# Patient Record
Sex: FEMALE | Race: WHITE | NOT HISPANIC OR LATINO | Employment: OTHER | URBAN - NONMETROPOLITAN AREA
[De-identification: names, ages, dates, MRNs, and addresses within clinical notes are randomized per-mention and may not be internally consistent; named-entity substitution may affect disease eponyms.]

---

## 2023-05-23 LAB
ESTIMATED AVERAGE GLUCOSE FOR HBA1C: 123 MG/DL
HEMOGLOBIN A1C/HEMOGLOBIN TOTAL IN BLOOD: 5.9 %

## 2023-05-24 LAB
ALANINE AMINOTRANSFERASE (SGPT) (U/L) IN SER/PLAS: 19 U/L (ref 7–45)
ALBUMIN (G/DL) IN SER/PLAS: 4.4 G/DL (ref 3.4–5)
ALKALINE PHOSPHATASE (U/L) IN SER/PLAS: 33 U/L (ref 33–136)
ANION GAP IN SER/PLAS: 13 MMOL/L (ref 10–20)
ASPARTATE AMINOTRANSFERASE (SGOT) (U/L) IN SER/PLAS: 19 U/L (ref 9–39)
BASOPHILS (10*3/UL) IN BLOOD BY AUTOMATED COUNT: 0.04 X10E9/L (ref 0–0.1)
BASOPHILS/100 LEUKOCYTES IN BLOOD BY AUTOMATED COUNT: 0.7 % (ref 0–2)
BILIRUBIN TOTAL (MG/DL) IN SER/PLAS: 0.4 MG/DL (ref 0–1.2)
CALCIDIOL (25 OH VITAMIN D3) (NG/ML) IN SER/PLAS: 51 NG/ML
CALCIUM (MG/DL) IN SER/PLAS: 9.9 MG/DL (ref 8.6–10.6)
CARBON DIOXIDE, TOTAL (MMOL/L) IN SER/PLAS: 32 MMOL/L (ref 21–32)
CHLORIDE (MMOL/L) IN SER/PLAS: 103 MMOL/L (ref 98–107)
CHOLESTEROL (MG/DL) IN SER/PLAS: 225 MG/DL (ref 0–199)
CHOLESTEROL IN HDL (MG/DL) IN SER/PLAS: 68.5 MG/DL
CHOLESTEROL/HDL RATIO: 3.3
CREATININE (MG/DL) IN SER/PLAS: 0.9 MG/DL (ref 0.5–1.05)
EOSINOPHILS (10*3/UL) IN BLOOD BY AUTOMATED COUNT: 0.14 X10E9/L (ref 0–0.4)
EOSINOPHILS/100 LEUKOCYTES IN BLOOD BY AUTOMATED COUNT: 2.6 % (ref 0–6)
ERYTHROCYTE DISTRIBUTION WIDTH (RATIO) BY AUTOMATED COUNT: 14 % (ref 11.5–14.5)
ERYTHROCYTE MEAN CORPUSCULAR HEMOGLOBIN CONCENTRATION (G/DL) BY AUTOMATED: 30 G/DL (ref 32–36)
ERYTHROCYTE MEAN CORPUSCULAR VOLUME (FL) BY AUTOMATED COUNT: 94 FL (ref 80–100)
ERYTHROCYTES (10*6/UL) IN BLOOD BY AUTOMATED COUNT: 4.13 X10E12/L (ref 4–5.2)
GFR FEMALE: 67 ML/MIN/1.73M2
GLUCOSE (MG/DL) IN SER/PLAS: 101 MG/DL (ref 74–99)
HEMATOCRIT (%) IN BLOOD BY AUTOMATED COUNT: 38.7 % (ref 36–46)
HEMOGLOBIN (G/DL) IN BLOOD: 11.6 G/DL (ref 12–16)
IMMATURE GRANULOCYTES/100 LEUKOCYTES IN BLOOD BY AUTOMATED COUNT: 0.2 % (ref 0–0.9)
IRON (UG/DL) IN SER/PLAS: 65 UG/DL (ref 35–150)
LDL: 127 MG/DL (ref 0–99)
LEUKOCYTES (10*3/UL) IN BLOOD BY AUTOMATED COUNT: 5.4 X10E9/L (ref 4.4–11.3)
LYMPHOCYTES (10*3/UL) IN BLOOD BY AUTOMATED COUNT: 1.73 X10E9/L (ref 0.8–3)
LYMPHOCYTES/100 LEUKOCYTES IN BLOOD BY AUTOMATED COUNT: 32 % (ref 13–44)
MONOCYTES (10*3/UL) IN BLOOD BY AUTOMATED COUNT: 0.35 X10E9/L (ref 0.05–0.8)
MONOCYTES/100 LEUKOCYTES IN BLOOD BY AUTOMATED COUNT: 6.5 % (ref 2–10)
NEUTROPHILS (10*3/UL) IN BLOOD BY AUTOMATED COUNT: 3.13 X10E9/L (ref 1.6–5.5)
NEUTROPHILS/100 LEUKOCYTES IN BLOOD BY AUTOMATED COUNT: 58 % (ref 40–80)
NRBC (PER 100 WBCS) BY AUTOMATED COUNT: 0 /100 WBC (ref 0–0)
PLATELETS (10*3/UL) IN BLOOD AUTOMATED COUNT: 231 X10E9/L (ref 150–450)
POTASSIUM (MMOL/L) IN SER/PLAS: 4.3 MMOL/L (ref 3.5–5.3)
PROTEIN TOTAL: 7.1 G/DL (ref 6.4–8.2)
SODIUM (MMOL/L) IN SER/PLAS: 144 MMOL/L (ref 136–145)
TRIGLYCERIDE (MG/DL) IN SER/PLAS: 149 MG/DL (ref 0–149)
UREA NITROGEN (MG/DL) IN SER/PLAS: 25 MG/DL (ref 6–23)
VLDL: 30 MG/DL (ref 0–40)

## 2023-05-26 ENCOUNTER — OFFICE VISIT (OUTPATIENT)
Dept: PRIMARY CARE | Facility: CLINIC | Age: 73
End: 2023-05-26
Payer: MEDICARE

## 2023-05-26 VITALS
DIASTOLIC BLOOD PRESSURE: 71 MMHG | HEIGHT: 63 IN | HEART RATE: 67 BPM | TEMPERATURE: 97.8 F | WEIGHT: 176.6 LBS | SYSTOLIC BLOOD PRESSURE: 150 MMHG | OXYGEN SATURATION: 98 % | BODY MASS INDEX: 31.29 KG/M2

## 2023-05-26 DIAGNOSIS — D50.9 IRON DEFICIENCY ANEMIA, UNSPECIFIED IRON DEFICIENCY ANEMIA TYPE: ICD-10-CM

## 2023-05-26 DIAGNOSIS — E55.9 VITAMIN D DEFICIENCY: ICD-10-CM

## 2023-05-26 DIAGNOSIS — E53.9 VITAMIN B DEFICIENCY: ICD-10-CM

## 2023-05-26 DIAGNOSIS — E78.5 HYPERLIPIDEMIA, UNSPECIFIED HYPERLIPIDEMIA TYPE: ICD-10-CM

## 2023-05-26 DIAGNOSIS — M54.41 ACUTE RIGHT-SIDED LOW BACK PAIN WITH RIGHT-SIDED SCIATICA: ICD-10-CM

## 2023-05-26 DIAGNOSIS — G89.29 CHRONIC BILATERAL LOW BACK PAIN WITH RIGHT-SIDED SCIATICA: Primary | ICD-10-CM

## 2023-05-26 DIAGNOSIS — I10 BENIGN ESSENTIAL HTN: ICD-10-CM

## 2023-05-26 DIAGNOSIS — M54.41 CHRONIC BILATERAL LOW BACK PAIN WITH RIGHT-SIDED SCIATICA: Primary | ICD-10-CM

## 2023-05-26 DIAGNOSIS — C50.912 MALIGNANT NEOPLASM OF LEFT FEMALE BREAST, UNSPECIFIED ESTROGEN RECEPTOR STATUS, UNSPECIFIED SITE OF BREAST (MULTI): ICD-10-CM

## 2023-05-26 DIAGNOSIS — R73.01 ELEVATED FASTING BLOOD SUGAR: ICD-10-CM

## 2023-05-26 PROBLEM — M54.50 CHRONIC BILATERAL LOW BACK PAIN: Status: ACTIVE | Noted: 2023-05-26

## 2023-05-26 PROBLEM — I83.90 VARICOSE VEIN OF LEG: Status: ACTIVE | Noted: 2023-05-26

## 2023-05-26 PROBLEM — D64.9 ANEMIA: Status: ACTIVE | Noted: 2023-05-26

## 2023-05-26 PROBLEM — Z78.0 POSTMENOPAUSAL: Status: ACTIVE | Noted: 2023-05-26

## 2023-05-26 PROBLEM — F41.8 SITUATIONAL ANXIETY: Status: ACTIVE | Noted: 2023-05-26

## 2023-05-26 PROBLEM — R91.1 PULMONARY NODULE: Status: ACTIVE | Noted: 2023-05-26

## 2023-05-26 PROBLEM — R73.9 ELEVATED BLOOD SUGAR: Status: ACTIVE | Noted: 2023-05-26

## 2023-05-26 PROBLEM — R92.30 DENSE BREAST TISSUE ON MAMMOGRAM: Status: ACTIVE | Noted: 2023-05-26

## 2023-05-26 PROBLEM — E66.9 OBESITY WITH BODY MASS INDEX 30 OR GREATER: Status: ACTIVE | Noted: 2021-05-25

## 2023-05-26 PROBLEM — R00.2 PALPITATIONS: Status: ACTIVE | Noted: 2023-05-26

## 2023-05-26 PROBLEM — M21.6X2 PRONATION DEFORMITY OF BOTH FEET: Status: ACTIVE | Noted: 2023-05-26

## 2023-05-26 PROBLEM — I49.3 PVC (PREMATURE VENTRICULAR CONTRACTION): Status: ACTIVE | Noted: 2023-05-26

## 2023-05-26 PROBLEM — M21.6X1 PRONATION DEFORMITY OF BOTH FEET: Status: ACTIVE | Noted: 2023-05-26

## 2023-05-26 PROBLEM — R22.42 LEG MASS, LEFT: Status: ACTIVE | Noted: 2023-05-26

## 2023-05-26 PROBLEM — D64.9 ANEMIA: Status: RESOLVED | Noted: 2023-05-26 | Resolved: 2023-05-26

## 2023-05-26 PROBLEM — R19.7 DIARRHEA: Status: RESOLVED | Noted: 2023-05-26 | Resolved: 2023-05-26

## 2023-05-26 PROBLEM — J30.9 ALLERGIC RHINITIS: Status: ACTIVE | Noted: 2023-05-26

## 2023-05-26 PROCEDURE — 3077F SYST BP >= 140 MM HG: CPT | Performed by: FAMILY MEDICINE

## 2023-05-26 PROCEDURE — 3078F DIAST BP <80 MM HG: CPT | Performed by: FAMILY MEDICINE

## 2023-05-26 PROCEDURE — 1160F RVW MEDS BY RX/DR IN RCRD: CPT | Performed by: FAMILY MEDICINE

## 2023-05-26 PROCEDURE — 1036F TOBACCO NON-USER: CPT | Performed by: FAMILY MEDICINE

## 2023-05-26 PROCEDURE — 1159F MED LIST DOCD IN RCRD: CPT | Performed by: FAMILY MEDICINE

## 2023-05-26 PROCEDURE — 99214 OFFICE O/P EST MOD 30 MIN: CPT | Performed by: FAMILY MEDICINE

## 2023-05-26 RX ORDER — AMLODIPINE BESYLATE 2.5 MG/1
2.5 TABLET ORAL DAILY
Qty: 90 TABLET | Refills: 3 | Status: SHIPPED | OUTPATIENT
Start: 2023-05-26 | End: 2023-05-26 | Stop reason: SDUPTHER

## 2023-05-26 RX ORDER — LOSARTAN POTASSIUM 50 MG/1
50 TABLET ORAL 2 TIMES DAILY
COMMUNITY
End: 2023-08-21 | Stop reason: SDUPTHER

## 2023-05-26 RX ORDER — AMLODIPINE BESYLATE 2.5 MG/1
2.5 TABLET ORAL DAILY
Qty: 90 TABLET | Refills: 3 | Status: SHIPPED | OUTPATIENT
Start: 2023-05-26 | End: 2023-06-15 | Stop reason: SDUPTHER

## 2023-05-26 RX ORDER — LOSARTAN POTASSIUM 25 MG/1
25 TABLET ORAL
COMMUNITY
End: 2023-05-26 | Stop reason: SDUPTHER

## 2023-05-26 RX ORDER — CHLORTHALIDONE 25 MG/1
25 TABLET ORAL DAILY
COMMUNITY
End: 2023-08-28 | Stop reason: SDUPTHER

## 2023-05-26 RX ORDER — CETIRIZINE HYDROCHLORIDE 10 MG/1
10 TABLET ORAL
COMMUNITY
End: 2023-05-26 | Stop reason: SDUPTHER

## 2023-05-26 RX ORDER — MULTIVITAMIN/IRON/FOLIC ACID 18MG-0.4MG
1 TABLET ORAL DAILY
COMMUNITY

## 2023-05-26 RX ORDER — OMEPRAZOLE 20 MG/1
20 CAPSULE, DELAYED RELEASE ORAL DAILY
COMMUNITY
End: 2023-10-10 | Stop reason: SDUPTHER

## 2023-05-26 RX ORDER — ONDANSETRON 4 MG/1
TABLET, FILM COATED ORAL
COMMUNITY
Start: 2021-09-28 | End: 2023-08-08 | Stop reason: WASHOUT

## 2023-05-26 RX ORDER — CETIRIZINE HYDROCHLORIDE 10 MG/1
1 TABLET ORAL DAILY PRN
COMMUNITY
Start: 2021-05-28

## 2023-05-26 RX ORDER — ANASTROZOLE 1 MG/1
1 TABLET ORAL DAILY
COMMUNITY

## 2023-05-26 RX ORDER — ASPIRIN 325 MG
TABLET, DELAYED RELEASE (ENTERIC COATED) ORAL
COMMUNITY
End: 2023-10-10 | Stop reason: SDUPTHER

## 2023-05-26 ASSESSMENT — ENCOUNTER SYMPTOMS
ARTHRALGIAS: 1
ENDOCRINE NEGATIVE: 1
ALLERGIC/IMMUNOLOGIC NEGATIVE: 1
CARDIOVASCULAR NEGATIVE: 1
EYES NEGATIVE: 1
BACK PAIN: 1
GASTROINTESTINAL NEGATIVE: 1
HEMATOLOGIC/LYMPHATIC NEGATIVE: 1
CONSTITUTIONAL NEGATIVE: 1
NEUROLOGICAL NEGATIVE: 1
RESPIRATORY NEGATIVE: 1
PSYCHIATRIC NEGATIVE: 1

## 2023-05-26 NOTE — PROGRESS NOTES
"Subjective   Patient ID: Obdulia Washington is a 72 y.o. female who presents for Follow-up (6 month follow up chol, gerd, discuss ct/abn/pelvis and not sure for her, sciatic pain to right mostly, back pain getting worse maybe xrays needed, hips).    HPI   Patient is currently not taking cholesterol medication. She has been experiencing a cough. Patient has been experiencing acid indigestion and reflux. She notes she has been struggling with weight gain. Patient notes her lower left side side hurts, intermittently across to her right side. She denies her sciatica is on her right side, not her left. Patient denies pain of her hips, but notes muscle aches.     Xrays of lumbar for low back pain      Review of Systems   Constitutional: Negative.    HENT: Negative.     Eyes: Negative.    Respiratory: Negative.     Cardiovascular: Negative.    Gastrointestinal: Negative.    Endocrine: Negative.    Genitourinary: Negative.    Musculoskeletal:  Positive for arthralgias and back pain.   Skin: Negative.    Allergic/Immunologic: Negative.    Neurological: Negative.    Hematological: Negative.    Psychiatric/Behavioral: Negative.         Objective   /71 (BP Location: Right arm, Patient Position: Sitting, BP Cuff Size: Adult long)   Pulse 67   Temp 36.6 °C (97.8 °F) (Temporal)   Ht 1.6 m (5' 3\")   Wt 80.1 kg (176 lb 9.6 oz)   SpO2 98%   BMI 31.28 kg/m²     Physical Exam  Constitutional:       Appearance: Normal appearance.   HENT:      Head: Normocephalic and atraumatic.      Right Ear: Tympanic membrane normal.      Left Ear: Tympanic membrane normal.      Nose: Nose normal.      Mouth/Throat:      Mouth: Mucous membranes are moist.      Pharynx: Oropharynx is clear.   Eyes:      Extraocular Movements: Extraocular movements intact.      Conjunctiva/sclera: Conjunctivae normal.      Pupils: Pupils are equal, round, and reactive to light.   Cardiovascular:      Rate and Rhythm: Normal rate and regular rhythm.      " Pulses: Normal pulses.      Heart sounds: Normal heart sounds.   Pulmonary:      Effort: Pulmonary effort is normal.      Breath sounds: Normal breath sounds.   Abdominal:      General: Bowel sounds are normal.      Palpations: Abdomen is soft.   Musculoskeletal:         General: Normal range of motion.      Cervical back: Normal range of motion and neck supple.   Skin:     General: Skin is warm.   Neurological:      Mental Status: She is alert and oriented to person, place, and time.   Psychiatric:         Mood and Affect: Mood normal.         Behavior: Behavior normal.         Assessment/Plan   Diagnoses and all orders for this visit:  Chronic bilateral low back pain with right-sided sciatica  Benign essential HTN  -     CBC and Auto Differential; Future  -     Follow Up In [facility] - PCP; Future  -     amLODIPine (Norvasc) 2.5 mg tablet; Take 1 tablet (2.5 mg) by mouth once daily.  Vitamin B deficiency  Vitamin D deficiency  Iron deficiency anemia, unspecified iron deficiency anemia type  -     Lipid Panel; Future  Hyperlipidemia, unspecified hyperlipidemia type  -     Comprehensive Metabolic Panel; Future  -     Lipid Panel; Future  Malignant neoplasm of left female breast, unspecified estrogen receptor status, unspecified site of breast (CMS/HCC)  Elevated fasting blood sugar  -     Hemoglobin A1C; Future  Acute right-sided low back pain with right-sided sciatica  -     XR lumbar spine complete 4+ views; Future  1.  Hypertension    Today in the office blood pressure mildly elevated please start on a new medication amlodipine 2.5 mg once a day every day in addition to chlorthalidone 25 mg a day BMP losartan 50 mg twice a day.  By lowering your blood pressure we are reducing risk for heart attack and stroke.  If possible check blood pressures once in the morning once in the evening for 2 weeks.  Goal for systolic will be consistently less than 130 bottom number consistently less than 80    2.   Elevated cholesterol    Your recent please continue eating a heart healthy diet rich with fresh fruit fresh vegetables lean proteins avoiding simple sugars and fast foods.    Walk providing that your back and hips and leg are tolerable 30 minutes of walking a day would be ideal    Certainly you would call if you have any chest pains with your activities    3.  Low back pain with sciatica.    You have been evaluated by the chiropractor.  I am recommending x-rays of the lumbar spine    Symptoms resolved.  Depending on the results of the x-rays may help the chiropractor determine what is the best treatment plan versus medication versus MRI    4.  Muscle ache.  I do think the majority of your muscle aches is most likely coming from the anastrozole.  I would encourage you to stay on the medication which is reducing your risk for future breast disease.  Hopefully 1 more year you can be off the medicine.    5.  We did review other lab work was normal except for a mild anemia    You have been evaluated by the GI, specialist, no further testing is needed, repeat in 6 months    6.  Continue on the omeprazole Prilosec to prevent GERD    7.  Continue on your vitamin supplementation including your vitamin D    8.  If you otherwise stay healthy I will see you back in 6 months please have repeat lab work prior to that appointment         Scribe Attestation  By signing my name below, I, Lorelei Mendoza , Scr[employer]   attest that this documentation has been prepared under the direction and in the presence of Johnny Arora MD.

## 2023-05-31 PROBLEM — R73.01 ELEVATED FASTING BLOOD SUGAR: Status: ACTIVE | Noted: 2023-05-26

## 2023-05-31 PROBLEM — M54.41 ACUTE RIGHT-SIDED LOW BACK PAIN WITH RIGHT-SIDED SCIATICA: Status: ACTIVE | Noted: 2023-05-31

## 2023-06-13 ENCOUNTER — TELEPHONE (OUTPATIENT)
Dept: PRIMARY CARE | Facility: CLINIC | Age: 73
End: 2023-06-13
Payer: MEDICARE

## 2023-06-13 NOTE — TELEPHONE ENCOUNTER
Pt dropped of bp reading. I put in [facility] mailbox. Pt thinks the bp medication is not working. Pt is requesting a cb at [phone]

## 2023-06-15 DIAGNOSIS — I10 BENIGN ESSENTIAL HTN: ICD-10-CM

## 2023-06-15 RX ORDER — AMLODIPINE BESYLATE 2.5 MG/1
2.5 TABLET ORAL 2 TIMES DAILY
Qty: 180 TABLET | Refills: 3
Start: 2023-06-15 | End: 2023-07-17 | Stop reason: SDUPTHER

## 2023-06-16 NOTE — TELEPHONE ENCOUNTER
Call and tell her that I reviewed the blood pressure recordings and I am recommending she increase the dose of the amlodipine to 2.5 mg twice a day for a total of 5 mg  a day and if she needs me to send in a refill let me know and send me the results of the blood pressure in 1 week

## 2023-07-17 DIAGNOSIS — I10 BENIGN ESSENTIAL HTN: ICD-10-CM

## 2023-07-17 RX ORDER — AMLODIPINE BESYLATE 2.5 MG/1
2.5 TABLET ORAL 2 TIMES DAILY
Qty: 180 TABLET | Refills: 3 | Status: SHIPPED | OUTPATIENT
Start: 2023-07-17 | End: 2023-10-10 | Stop reason: SDUPTHER

## 2023-07-27 ENCOUNTER — TELEPHONE (OUTPATIENT)
Dept: PRIMARY CARE | Facility: CLINIC | Age: 73
End: 2023-07-27
Payer: MEDICARE

## 2023-07-27 DIAGNOSIS — I10 BENIGN ESSENTIAL HTN: ICD-10-CM

## 2023-07-27 NOTE — TELEPHONE ENCOUNTER
----- Message from Johnny Arora MD sent at 7/27/2023  2:47 PM EDT -----  Regarding: Blood pressure  Please ask the patient to check her blood pressure twice a day for the next 2 weeks and send me the results

## 2023-08-02 NOTE — PROGRESS NOTES
Subjective:      Obdulia Washington is a 73 y.o. female who presents for    [facility] pt    HPI:      Chief Complaint   Patient presents with    Fatigue    Shortness of Breath     Intermittent with exertion x approx 2 months  Pt believes sxs may be caused by BP meds       Not sweaty  Minimal chest symptoms  No nausea   No fam hxof heartdisease     has pt been seen recently for this problem ( within past 2-3 weeks) ? No    if yes- where?n/a    by who?n/a    what treatment was provided?n/a      Pt does describe intermittent BLE swelling - mild, on amlodipine- discussed with pt - she will observe and decide if she wants to change from the amlodipine in case this is causiong the swelling     Legs are never painful, red or warm       Has seen Dr Anand years ago         Social History     Tobacco Use   Smoking Status Former    Packs/day: 1.50    Years: 20.00    Total pack years: 30.00    Types: Cigarettes    Quit date:     Years since quittin.6    Passive exposure: Past   Smokeless Tobacco Never         Review of Systems:        Objective:      Vitals:    23 1059   BP: 118/69   BP Location: Right arm   Patient Position: Sitting   BP Cuff Size: Large adult   Pulse: 80   Resp: 14   Temp: 36.7 °C (98 °F)   SpO2: 98%   Weight: 80.2 kg (176 lb 14.4 oz)         Pt is A and O x3, NAD, nontoxic, well-hydrated   Head- normocephalic and atraumatic,   EYES- conjunctiva- normal   lids- normal  EARS/NOSE- normal external exam   CV- RRR without murmur  PULM- CTA bilaterally, normal respiratory effort  RESPIRATORY EFFORT- normal , no retractions or nasal flaring   ABD- normoactive BS's , soft, no HSM, no CVAT, NT   EXT- very mild bilateral ankle edema,NT, not red or warm, no calf swelling   SKIN- no abnormal skin lesions noted  NEURO- no focal deficits  PSYCH- pleasant, normal judgement and insight              Wt Readings from Last 3 Encounters:   23 80.2 kg (176 lb 14.4 oz)   23 80.1 kg (176 lb 9.6 oz)   22  76.4 kg (168 lb 6 oz)     BP Readings from Last 3 Encounters:   08/08/23 118/69   05/26/23 150/71   09/27/22 130/72       Assessment/Plan:    1. Other fatigue  Nuclear Stress Test      2. Dyspnea, unspecified type  Nuclear Stress Test      3. Benign essential HTN  Nuclear Stress Test      4. Hyperlipidemia, unspecified hyperlipidemia type  Nuclear Stress Test      5. Chest pain, unspecified type  ECG 12 lead    Nuclear Stress Test      6. Dyspnea on exertion  Nuclear Stress Test    XR chest 2 views      7. Former smoker            Orders Placed This Encounter   Procedures    XR chest 2 views    ECG 12 lead           Has follow up Dr Arora in October      Call if no better or if symptoms worsen

## 2023-08-08 ENCOUNTER — OFFICE VISIT (OUTPATIENT)
Dept: PRIMARY CARE | Facility: CLINIC | Age: 73
End: 2023-08-08
Payer: MEDICARE

## 2023-08-08 VITALS
BODY MASS INDEX: 31.34 KG/M2 | RESPIRATION RATE: 14 BRPM | SYSTOLIC BLOOD PRESSURE: 118 MMHG | OXYGEN SATURATION: 98 % | DIASTOLIC BLOOD PRESSURE: 69 MMHG | HEART RATE: 80 BPM | WEIGHT: 176.9 LBS | TEMPERATURE: 98 F

## 2023-08-08 DIAGNOSIS — E78.5 HYPERLIPIDEMIA, UNSPECIFIED HYPERLIPIDEMIA TYPE: ICD-10-CM

## 2023-08-08 DIAGNOSIS — R06.09 DYSPNEA ON EXERTION: ICD-10-CM

## 2023-08-08 DIAGNOSIS — R07.9 CHEST PAIN, UNSPECIFIED TYPE: ICD-10-CM

## 2023-08-08 DIAGNOSIS — Z87.891 FORMER SMOKER: ICD-10-CM

## 2023-08-08 DIAGNOSIS — I10 BENIGN ESSENTIAL HTN: ICD-10-CM

## 2023-08-08 DIAGNOSIS — R06.00 DYSPNEA, UNSPECIFIED TYPE: ICD-10-CM

## 2023-08-08 DIAGNOSIS — R53.83 OTHER FATIGUE: Primary | ICD-10-CM

## 2023-08-08 PROCEDURE — 99214 OFFICE O/P EST MOD 30 MIN: CPT | Performed by: FAMILY MEDICINE

## 2023-08-08 PROCEDURE — 3078F DIAST BP <80 MM HG: CPT | Performed by: FAMILY MEDICINE

## 2023-08-08 PROCEDURE — 3074F SYST BP LT 130 MM HG: CPT | Performed by: FAMILY MEDICINE

## 2023-08-08 PROCEDURE — 93000 ELECTROCARDIOGRAM COMPLETE: CPT | Performed by: FAMILY MEDICINE

## 2023-08-08 PROCEDURE — 1036F TOBACCO NON-USER: CPT | Performed by: FAMILY MEDICINE

## 2023-08-08 PROCEDURE — 1160F RVW MEDS BY RX/DR IN RCRD: CPT | Performed by: FAMILY MEDICINE

## 2023-08-08 PROCEDURE — 1159F MED LIST DOCD IN RCRD: CPT | Performed by: FAMILY MEDICINE

## 2023-08-08 RX ORDER — AMMONIUM LACTATE 12 G/100G
LOTION TOPICAL
COMMUNITY
Start: 2023-07-18

## 2023-08-21 DIAGNOSIS — I10 HYPERTENSION, UNSPECIFIED TYPE: ICD-10-CM

## 2023-08-21 RX ORDER — LOSARTAN POTASSIUM 50 MG/1
50 TABLET ORAL 2 TIMES DAILY
Qty: 180 TABLET | Refills: 3 | Status: SHIPPED | OUTPATIENT
Start: 2023-08-21 | End: 2023-10-10 | Stop reason: SDUPTHER

## 2023-08-28 DIAGNOSIS — I10 HYPERTENSION, UNSPECIFIED TYPE: ICD-10-CM

## 2023-08-28 RX ORDER — CHLORTHALIDONE 25 MG/1
25 TABLET ORAL DAILY
Qty: 90 TABLET | Refills: 3 | Status: SHIPPED | OUTPATIENT
Start: 2023-08-28 | End: 2023-10-10 | Stop reason: SDUPTHER

## 2023-08-29 ENCOUNTER — TELEPHONE (OUTPATIENT)
Dept: PRIMARY CARE | Facility: CLINIC | Age: 73
End: 2023-08-29
Payer: MEDICARE

## 2023-08-29 NOTE — TELEPHONE ENCOUNTER
S/w pt and she stated she is taking all three meds and will continue to take bp for next two weeks and send results

## 2023-08-29 NOTE — TELEPHONE ENCOUNTER
Pt's  dropped off her bp readings. I put in a sleeve in [facility] box. Pt would like a cb at [phone] once you review her bp log.

## 2023-08-29 NOTE — TELEPHONE ENCOUNTER
Please call the patient,  I reviewed the blood pressure values,  I want her to stay on the current medications of the chlorthalidone and the losartan and the amlodipine,  verify she is taking all 3 .  If possible take bp for another 2 weeks twice a day and send me the results

## 2023-09-15 DIAGNOSIS — M54.41 ACUTE RIGHT-SIDED LOW BACK PAIN WITH RIGHT-SIDED SCIATICA: ICD-10-CM

## 2023-09-15 DIAGNOSIS — M54.16 LUMBAR RADICULOPATHY: Primary | ICD-10-CM

## 2023-09-19 ENCOUNTER — LAB (OUTPATIENT)
Dept: LAB | Facility: LAB | Age: 73
End: 2023-09-19
Payer: MEDICARE

## 2023-09-19 ENCOUNTER — TELEPHONE (OUTPATIENT)
Dept: PRIMARY CARE | Facility: CLINIC | Age: 73
End: 2023-09-19

## 2023-09-19 DIAGNOSIS — E55.9 VITAMIN D DEFICIENCY: ICD-10-CM

## 2023-09-19 DIAGNOSIS — R73.01 ELEVATED FASTING BLOOD SUGAR: ICD-10-CM

## 2023-09-19 DIAGNOSIS — E78.5 HYPERLIPIDEMIA, UNSPECIFIED HYPERLIPIDEMIA TYPE: ICD-10-CM

## 2023-09-19 DIAGNOSIS — I10 BENIGN ESSENTIAL HTN: ICD-10-CM

## 2023-09-19 DIAGNOSIS — D50.9 IRON DEFICIENCY ANEMIA, UNSPECIFIED IRON DEFICIENCY ANEMIA TYPE: ICD-10-CM

## 2023-09-19 PROCEDURE — 85025 COMPLETE CBC W/AUTO DIFF WBC: CPT

## 2023-09-19 PROCEDURE — 80061 LIPID PANEL: CPT

## 2023-09-19 PROCEDURE — 82306 VITAMIN D 25 HYDROXY: CPT

## 2023-09-19 PROCEDURE — 36415 COLL VENOUS BLD VENIPUNCTURE: CPT

## 2023-09-19 PROCEDURE — 83036 HEMOGLOBIN GLYCOSYLATED A1C: CPT

## 2023-09-19 PROCEDURE — 80053 COMPREHEN METABOLIC PANEL: CPT

## 2023-09-19 NOTE — TELEPHONE ENCOUNTER
Patient at  lab (our location today) to have labs drawn for follow up next month.    Asking for vitamin d level to be added on. Patient reported that she thought it was already ordered.    Okay to add on?    FOR STAFF who receive the message-please let Adriana know

## 2023-09-19 NOTE — TELEPHONE ENCOUNTER
I placed the order (so that the test could be added on before lab closed and sent to Dr. Arora to Co-sign.)

## 2023-09-20 LAB
ALANINE AMINOTRANSFERASE (SGPT) (U/L) IN SER/PLAS: 24 U/L (ref 7–45)
ALBUMIN (G/DL) IN SER/PLAS: 4.4 G/DL (ref 3.4–5)
ALKALINE PHOSPHATASE (U/L) IN SER/PLAS: 31 U/L (ref 33–136)
ANION GAP IN SER/PLAS: 12 MMOL/L (ref 10–20)
ASPARTATE AMINOTRANSFERASE (SGOT) (U/L) IN SER/PLAS: 20 U/L (ref 9–39)
BASOPHILS (10*3/UL) IN BLOOD BY AUTOMATED COUNT: 0.04 X10E9/L (ref 0–0.1)
BASOPHILS/100 LEUKOCYTES IN BLOOD BY AUTOMATED COUNT: 0.8 % (ref 0–2)
BILIRUBIN TOTAL (MG/DL) IN SER/PLAS: 0.5 MG/DL (ref 0–1.2)
CALCIDIOL (25 OH VITAMIN D3) (NG/ML) IN SER/PLAS: 54 NG/ML
CALCIUM (MG/DL) IN SER/PLAS: 10.1 MG/DL (ref 8.6–10.6)
CARBON DIOXIDE, TOTAL (MMOL/L) IN SER/PLAS: 33 MMOL/L (ref 21–32)
CHLORIDE (MMOL/L) IN SER/PLAS: 101 MMOL/L (ref 98–107)
CHOLESTEROL (MG/DL) IN SER/PLAS: 220 MG/DL (ref 0–199)
CHOLESTEROL IN HDL (MG/DL) IN SER/PLAS: 67.3 MG/DL
CHOLESTEROL/HDL RATIO: 3.3
CREATININE (MG/DL) IN SER/PLAS: 0.91 MG/DL (ref 0.5–1.05)
EOSINOPHILS (10*3/UL) IN BLOOD BY AUTOMATED COUNT: 0.12 X10E9/L (ref 0–0.4)
EOSINOPHILS/100 LEUKOCYTES IN BLOOD BY AUTOMATED COUNT: 2.4 % (ref 0–6)
ERYTHROCYTE DISTRIBUTION WIDTH (RATIO) BY AUTOMATED COUNT: 13.6 % (ref 11.5–14.5)
ERYTHROCYTE MEAN CORPUSCULAR HEMOGLOBIN CONCENTRATION (G/DL) BY AUTOMATED: 31.4 G/DL (ref 32–36)
ERYTHROCYTE MEAN CORPUSCULAR VOLUME (FL) BY AUTOMATED COUNT: 89 FL (ref 80–100)
ERYTHROCYTES (10*6/UL) IN BLOOD BY AUTOMATED COUNT: 4.16 X10E12/L (ref 4–5.2)
ESTIMATED AVERAGE GLUCOSE FOR HBA1C: 120 MG/DL
GFR FEMALE: 66 ML/MIN/1.73M2
GLUCOSE (MG/DL) IN SER/PLAS: 113 MG/DL (ref 74–99)
HEMATOCRIT (%) IN BLOOD BY AUTOMATED COUNT: 36.9 % (ref 36–46)
HEMOGLOBIN (G/DL) IN BLOOD: 11.6 G/DL (ref 12–16)
HEMOGLOBIN A1C/HEMOGLOBIN TOTAL IN BLOOD: 5.8 %
IMMATURE GRANULOCYTES/100 LEUKOCYTES IN BLOOD BY AUTOMATED COUNT: 0.2 % (ref 0–0.9)
LDL: 125 MG/DL (ref 0–99)
LEUKOCYTES (10*3/UL) IN BLOOD BY AUTOMATED COUNT: 4.9 X10E9/L (ref 4.4–11.3)
LYMPHOCYTES (10*3/UL) IN BLOOD BY AUTOMATED COUNT: 1.37 X10E9/L (ref 0.8–3)
LYMPHOCYTES/100 LEUKOCYTES IN BLOOD BY AUTOMATED COUNT: 27.8 % (ref 13–44)
MONOCYTES (10*3/UL) IN BLOOD BY AUTOMATED COUNT: 0.36 X10E9/L (ref 0.05–0.8)
MONOCYTES/100 LEUKOCYTES IN BLOOD BY AUTOMATED COUNT: 7.3 % (ref 2–10)
NEUTROPHILS (10*3/UL) IN BLOOD BY AUTOMATED COUNT: 3.03 X10E9/L (ref 1.6–5.5)
NEUTROPHILS/100 LEUKOCYTES IN BLOOD BY AUTOMATED COUNT: 61.5 % (ref 40–80)
NRBC (PER 100 WBCS) BY AUTOMATED COUNT: 0 /100 WBC (ref 0–0)
PLATELETS (10*3/UL) IN BLOOD AUTOMATED COUNT: 236 X10E9/L (ref 150–450)
POTASSIUM (MMOL/L) IN SER/PLAS: 4.2 MMOL/L (ref 3.5–5.3)
PROTEIN TOTAL: 7.2 G/DL (ref 6.4–8.2)
SODIUM (MMOL/L) IN SER/PLAS: 142 MMOL/L (ref 136–145)
TRIGLYCERIDE (MG/DL) IN SER/PLAS: 138 MG/DL (ref 0–149)
UREA NITROGEN (MG/DL) IN SER/PLAS: 22 MG/DL (ref 6–23)
VLDL: 28 MG/DL (ref 0–40)

## 2023-10-06 ENCOUNTER — APPOINTMENT (OUTPATIENT)
Dept: PRIMARY CARE | Facility: CLINIC | Age: 73
End: 2023-10-06
Payer: MEDICARE

## 2023-10-10 ENCOUNTER — OFFICE VISIT (OUTPATIENT)
Dept: PRIMARY CARE | Facility: CLINIC | Age: 73
End: 2023-10-10
Payer: MEDICARE

## 2023-10-10 VITALS
HEART RATE: 80 BPM | HEIGHT: 63 IN | TEMPERATURE: 97.6 F | BODY MASS INDEX: 31.25 KG/M2 | OXYGEN SATURATION: 96 % | WEIGHT: 176.4 LBS | SYSTOLIC BLOOD PRESSURE: 135 MMHG | DIASTOLIC BLOOD PRESSURE: 72 MMHG

## 2023-10-10 DIAGNOSIS — I10 HYPERTENSION, UNSPECIFIED TYPE: ICD-10-CM

## 2023-10-10 DIAGNOSIS — Z00.00 MEDICARE ANNUAL WELLNESS VISIT, SUBSEQUENT: Primary | ICD-10-CM

## 2023-10-10 DIAGNOSIS — Z00.00 ROUTINE GENERAL MEDICAL EXAMINATION AT HEALTH CARE FACILITY: ICD-10-CM

## 2023-10-10 DIAGNOSIS — D64.9 ANEMIA, UNSPECIFIED TYPE: ICD-10-CM

## 2023-10-10 DIAGNOSIS — C50.912 MALIGNANT NEOPLASM OF LEFT FEMALE BREAST, UNSPECIFIED ESTROGEN RECEPTOR STATUS, UNSPECIFIED SITE OF BREAST (MULTI): ICD-10-CM

## 2023-10-10 DIAGNOSIS — E55.9 VITAMIN D DEFICIENCY: ICD-10-CM

## 2023-10-10 DIAGNOSIS — R73.01 ELEVATED FASTING BLOOD SUGAR: ICD-10-CM

## 2023-10-10 DIAGNOSIS — K21.9 GASTROESOPHAGEAL REFLUX DISEASE WITHOUT ESOPHAGITIS: ICD-10-CM

## 2023-10-10 DIAGNOSIS — I73.89 OTHER SPECIFIED PERIPHERAL VASCULAR DISEASES (CMS-HCC): ICD-10-CM

## 2023-10-10 DIAGNOSIS — I10 BENIGN ESSENTIAL HTN: ICD-10-CM

## 2023-10-10 DIAGNOSIS — E78.2 MIXED HYPERLIPIDEMIA: ICD-10-CM

## 2023-10-10 PROCEDURE — G0439 PPPS, SUBSEQ VISIT: HCPCS | Performed by: FAMILY MEDICINE

## 2023-10-10 PROCEDURE — 99214 OFFICE O/P EST MOD 30 MIN: CPT | Performed by: FAMILY MEDICINE

## 2023-10-10 PROCEDURE — 3078F DIAST BP <80 MM HG: CPT | Performed by: FAMILY MEDICINE

## 2023-10-10 PROCEDURE — 1036F TOBACCO NON-USER: CPT | Performed by: FAMILY MEDICINE

## 2023-10-10 PROCEDURE — 1170F FXNL STATUS ASSESSED: CPT | Performed by: FAMILY MEDICINE

## 2023-10-10 PROCEDURE — 1159F MED LIST DOCD IN RCRD: CPT | Performed by: FAMILY MEDICINE

## 2023-10-10 PROCEDURE — G0008 ADMIN INFLUENZA VIRUS VAC: HCPCS | Performed by: FAMILY MEDICINE

## 2023-10-10 PROCEDURE — 3075F SYST BP GE 130 - 139MM HG: CPT | Performed by: FAMILY MEDICINE

## 2023-10-10 PROCEDURE — 1160F RVW MEDS BY RX/DR IN RCRD: CPT | Performed by: FAMILY MEDICINE

## 2023-10-10 PROCEDURE — 90662 IIV NO PRSV INCREASED AG IM: CPT | Performed by: FAMILY MEDICINE

## 2023-10-10 RX ORDER — AMLODIPINE BESYLATE 2.5 MG/1
2.5 TABLET ORAL 2 TIMES DAILY
Qty: 180 TABLET | Refills: 3 | Status: SHIPPED | OUTPATIENT
Start: 2023-10-10 | End: 2024-10-09

## 2023-10-10 RX ORDER — OMEPRAZOLE 20 MG/1
20 CAPSULE, DELAYED RELEASE ORAL DAILY
Qty: 90 CAPSULE | Refills: 3 | Status: SHIPPED | OUTPATIENT
Start: 2023-10-10 | End: 2024-10-09

## 2023-10-10 RX ORDER — CHLORTHALIDONE 25 MG/1
25 TABLET ORAL DAILY
Qty: 90 TABLET | Refills: 3 | Status: SHIPPED | OUTPATIENT
Start: 2023-10-10

## 2023-10-10 RX ORDER — LOSARTAN POTASSIUM 50 MG/1
50 TABLET ORAL 2 TIMES DAILY
Qty: 180 TABLET | Refills: 3 | Status: SHIPPED | OUTPATIENT
Start: 2023-10-10 | End: 2024-10-09

## 2023-10-10 RX ORDER — ASPIRIN 325 MG
50000 TABLET, DELAYED RELEASE (ENTERIC COATED) ORAL
Qty: 12 CAPSULE | Refills: 3 | Status: SHIPPED | OUTPATIENT
Start: 2023-10-10 | End: 2024-10-09

## 2023-10-10 RX ORDER — DOXYCYCLINE 100 MG/1
100 TABLET ORAL DAILY
COMMUNITY
Start: 2023-10-06

## 2023-10-10 ASSESSMENT — ENCOUNTER SYMPTOMS
CONSTITUTIONAL NEGATIVE: 1
ARTHRALGIAS: 1
ALLERGIC/IMMUNOLOGIC NEGATIVE: 1
NEUROLOGICAL NEGATIVE: 1
GASTROINTESTINAL NEGATIVE: 1
RESPIRATORY NEGATIVE: 1
PSYCHIATRIC NEGATIVE: 1
EYES NEGATIVE: 1
ENDOCRINE NEGATIVE: 1
CARDIOVASCULAR NEGATIVE: 1
HEMATOLOGIC/LYMPHATIC NEGATIVE: 1
MYALGIAS: 1

## 2023-10-10 ASSESSMENT — ACTIVITIES OF DAILY LIVING (ADL)
TAKING_MEDICATION: INDEPENDENT
BATHING: INDEPENDENT
GROCERY_SHOPPING: INDEPENDENT
DOING_HOUSEWORK: INDEPENDENT
MANAGING_FINANCES: INDEPENDENT
DRESSING: INDEPENDENT

## 2023-10-10 NOTE — PATIENT INSTRUCTIONS
1.  Medicare wellness    Today in the office you had your annual Medicare wellness visit    Received your flu shot today I would encourage you to get the COVID-vaccine 2 weeks from now and then the RSV 2 weeks after that    Up-to-date with your colonoscopy    Up-to-date with your mammogram    We did review recent labs he continues to be mildly anemic we will continue to monitor closely we have discussed this with your gastroenterologist.    You note significant improvement with overall health since starting on a magnesium supplement please continue to do so    He continued to have lower back pain and mid back pain and upper back pain you have an appointment to see pain management hopefully they will be able to address these issues please continue doing exercises that you have learned from your chiropractor    Continue on all current medications which are helping lower your blood pressure by lowering the blood pressure we are reducing risk for heart attack and stroke    Continue on omeprazole for stomach acid    Continue on the vitamin D for vitamin D supplementation    Continue on the anastrozole which is reducing risk for any breast disease    If you continue to stay healthy I would like to see you back in 6 months    I encourage all my patients to continue to eat a healthy diet 1 that would be made up of 5-7 servings of fresh fruits and vegetables every day along with lean proteins and walking 30 minutes a day

## 2023-10-10 NOTE — PROGRESS NOTES
"Subjective   Patient ID: Obdulia Washington is a 73 y.o. female who presents for Medicare Annual Wellness Visit Subsequent ([facility]).    HPI     Hypertension: The patient condition is stable. The patient is currently compliant with their current medication regimen and are tolerating it appropriately. There are no further concerns at this time.     Muscle aches: The patient reports that their muscle aches are significantly less. The patient has been taking a magnesium supplement and has gotten significant relief.     Hypertension: The patient condition is stable. The patient is currently compliant with their current medication regimen and are tolerating it appropriately. There are no further concerns at this time.     Hyperlipidemia: The patient condition is stable. The patient is currently compliant with their current medication regimen and are tolerating it appropriately. There are no further concerns at this time.      Review of Systems   Constitutional: Negative.    HENT: Negative.     Eyes: Negative.    Respiratory: Negative.     Cardiovascular: Negative.    Gastrointestinal: Negative.    Endocrine: Negative.    Genitourinary: Negative.    Musculoskeletal:  Positive for arthralgias and myalgias.   Skin: Negative.    Allergic/Immunologic: Negative.    Neurological: Negative.    Hematological: Negative.    Psychiatric/Behavioral: Negative.         Objective   /72 (BP Location: Right arm, Patient Position: Sitting, BP Cuff Size: Adult)   Pulse 80   Temp 36.4 °C (97.6 °F) (Temporal)   Ht 1.594 m (5' 2.75\")   Wt 80 kg (176 lb 6.4 oz)   SpO2 96%   BMI 31.50 kg/m²     Physical Exam  Constitutional:       Appearance: Normal appearance.   HENT:      Head: Normocephalic and atraumatic.      Nose: Nose normal.   Eyes:      Extraocular Movements: Extraocular movements intact.      Conjunctiva/sclera: Conjunctivae normal.      Pupils: Pupils are equal, round, and reactive to light.   Cardiovascular:      Rate and " Rhythm: Normal rate and regular rhythm.      Pulses: Normal pulses.      Heart sounds: Normal heart sounds.   Pulmonary:      Effort: Pulmonary effort is normal.      Breath sounds: Normal breath sounds.   Abdominal:      General: Bowel sounds are normal.      Palpations: Abdomen is soft.   Genitourinary:     General: Normal vulva.      Rectum: Normal.   Musculoskeletal:         General: Normal range of motion.      Cervical back: Normal range of motion and neck supple.   Skin:     General: Skin is warm.   Neurological:      Mental Status: She is alert and oriented to person, place, and time.   Psychiatric:         Mood and Affect: Mood normal.         Behavior: Behavior normal.         Thought Content: Thought content normal.         Judgment: Judgment normal.         Assessment/Plan   Problem List Items Addressed This Visit             ICD-10-CM    Benign essential HTN I10    Relevant Medications    amLODIPine (Norvasc) 2.5 mg tablet    Breast cancer, left (CMS/[facility]) C50.912    Elevated fasting blood sugar R73.01    Hyperlipidemia E78.5    Relevant Orders    Lipid Panel    Vitamin D deficiency E55.9    Relevant Medications    cholecalciferol (Vitamin D-3) 1,250 mcg (50,000 unit) capsule    Other Relevant Orders    Vitamin D 25-Hydroxy,Total (for eval of Vitamin D levels)    Other specified peripheral vascular diseases (CMS/[facility]) I73.89    Medicare annual wellness visit, subsequent - Primary Z00.00     Other Visit Diagnoses         Codes    Routine general medical examination at health care facility     Z00.00    Relevant Orders    Vitamin D 25-Hydroxy,Total (for eval of Vitamin D levels)    Lipid Panel    Hemoglobin A1c    Comprehensive metabolic panel    CBC and Auto Differential    Follow Up In [facility] - PCP - Health Maintenance    Anemia, unspecified type     D64.9    Relevant Orders    CBC and Auto Differential    Hypertension, unspecified type     I10    Relevant Medications    losartan (Cozaar)  50 mg tablet    chlorthalidone (Hygroton) 25 mg tablet    Gastroesophageal reflux disease without esophagitis     K21.9    Relevant Medications    omeprazole (PriLOSEC) 20 mg DR capsule          1. Routine general medical examination at health care facility        2. Benign essential HTN  Follow Up In [facility] - PCP      3. Mixed hyperlipidemia          1.  Medicare wellness    Today in the office you had your annual Medicare wellness visit    Received your flu shot today I would encourage you to get the COVID-vaccine 2 weeks from now and then the RSV 2 weeks after that    Up-to-date with your colonoscopy    Up-to-date with your mammogram    We did review recent labs he continues to be mildly anemic we will continue to monitor closely we have discussed this with your gastroenterologist.    You note significant improvement with overall health since starting on a magnesium supplement please continue to do so    He continued to have lower back pain and mid back pain and upper back pain you have an appointment to see pain management hopefully they will be able to address these issues please continue doing exercises that you have learned from your chiropractor    Continue on all current medications which are helping lower your blood pressure by lowering the blood pressure we are reducing risk for heart attack and stroke    Continue on omeprazole for stomach acid    Continue on the vitamin D for vitamin D supplementation    Continue on the anastrozole which is reducing risk for any breast disease    If you continue to stay healthy I would like to see you back in 6 months    I encourage all my patients to continue to eat a healthy diet 1 that would be made up of 5-7 servings of fresh fruits and vegetables every day along with lean proteins and walking 30 minutes a day     Follow-up in 6 months or sooner if there are any concerns.    Scribe Attestation  By signing my name below, IMary Scribe attest  that this documentation has been prepared under the direction and in the presence of Johnny Arora MD on 10/10/2023 at 2:45pm.

## 2023-10-10 NOTE — PROGRESS NOTES
"Subjective   Reason for Visit: Obdulia Washington is an 73 y.o. female here for a Medicare Wellness visit.     Past Medical, Surgical, and Family History reviewed and updated in chart.         HPI    Patient Care Team:  Johnny Arora MD as PCP - General  Johnny Arora MD as PCP - [facility] Attributed Provider     Review of Systems    Objective   Vitals:  /72 (BP Location: Right arm, Patient Position: Sitting, BP Cuff Size: Adult)   Pulse 80   Temp 36.4 °C (97.6 °F) (Temporal)   Ht 1.594 m (5' 2.75\")   Wt 80 kg (176 lb 6.4 oz)   SpO2 96%   BMI 31.50 kg/m²       Physical Exam    Assessment/Plan   Problem List Items Addressed This Visit       Benign essential HTN    Relevant Medications    amLODIPine (Norvasc) 2.5 mg tablet    Breast cancer, left (CMS/[facility])    Elevated fasting blood sugar    Hyperlipidemia    Relevant Orders    Lipid Panel    Vitamin D deficiency    Relevant Medications    cholecalciferol (Vitamin D-3) 1,250 mcg (50,000 unit) capsule    Other Relevant Orders    Vitamin D 25-Hydroxy,Total (for eval of Vitamin D levels)    Other specified peripheral vascular diseases (CMS/[facility])    Medicare annual wellness visit, subsequent - Primary     Other Visit Diagnoses       Routine general medical examination at health care facility        Relevant Orders    Vitamin D 25-Hydroxy,Total (for eval of Vitamin D levels)    Lipid Panel    Hemoglobin A1c    Comprehensive metabolic panel    CBC and Auto Differential    Follow Up In [facility] - PCP - Health Maintenance    Anemia, unspecified type        Relevant Orders    CBC and Auto Differential    Hypertension, unspecified type        Relevant Medications    losartan (Cozaar) 50 mg tablet    chlorthalidone (Hygroton) 25 mg tablet    Gastroesophageal reflux disease without esophagitis        Relevant Medications    omeprazole (PriLOSEC) 20 mg DR capsule               "

## 2023-10-13 ENCOUNTER — CONSULT (OUTPATIENT)
Dept: PAIN MEDICINE | Facility: CLINIC | Age: 73
End: 2023-10-13
Payer: MEDICARE

## 2023-10-13 VITALS
WEIGHT: 173 LBS | TEMPERATURE: 96.8 F | DIASTOLIC BLOOD PRESSURE: 67 MMHG | BODY MASS INDEX: 30.65 KG/M2 | RESPIRATION RATE: 18 BRPM | SYSTOLIC BLOOD PRESSURE: 160 MMHG | HEART RATE: 75 BPM | HEIGHT: 63 IN

## 2023-10-13 DIAGNOSIS — M54.51 VERTEBROGENIC LOW BACK PAIN: Primary | ICD-10-CM

## 2023-10-13 DIAGNOSIS — M54.41 ACUTE RIGHT-SIDED LOW BACK PAIN WITH RIGHT-SIDED SCIATICA: ICD-10-CM

## 2023-10-13 DIAGNOSIS — M47.816 LUMBAR SPONDYLOSIS: ICD-10-CM

## 2023-10-13 PROBLEM — I73.89 OTHER SPECIFIED PERIPHERAL VASCULAR DISEASES (CMS-HCC): Status: ACTIVE | Noted: 2023-10-13

## 2023-10-13 PROBLEM — Z00.00 MEDICARE ANNUAL WELLNESS VISIT, SUBSEQUENT: Status: ACTIVE | Noted: 2023-10-13

## 2023-10-13 PROCEDURE — 99204 OFFICE O/P NEW MOD 45 MIN: CPT | Performed by: ANESTHESIOLOGY

## 2023-10-13 PROCEDURE — 99214 OFFICE O/P EST MOD 30 MIN: CPT | Performed by: ANESTHESIOLOGY

## 2023-10-13 ASSESSMENT — COLUMBIA-SUICIDE SEVERITY RATING SCALE - C-SSRS
6. HAVE YOU EVER DONE ANYTHING, STARTED TO DO ANYTHING, OR PREPARED TO DO ANYTHING TO END YOUR LIFE?: NO
2. HAVE YOU ACTUALLY HAD ANY THOUGHTS OF KILLING YOURSELF?: NO
1. IN THE PAST MONTH, HAVE YOU WISHED YOU WERE DEAD OR WISHED YOU COULD GO TO SLEEP AND NOT WAKE UP?: NO

## 2023-10-13 ASSESSMENT — PATIENT HEALTH QUESTIONNAIRE - PHQ9
1. LITTLE INTEREST OR PLEASURE IN DOING THINGS: NOT AT ALL
SUM OF ALL RESPONSES TO PHQ9 QUESTIONS 1 AND 2: 0
2. FEELING DOWN, DEPRESSED OR HOPELESS: NOT AT ALL

## 2023-10-13 ASSESSMENT — ENCOUNTER SYMPTOMS
OCCASIONAL FEELINGS OF UNSTEADINESS: 0
DEPRESSION: 0
BACK PAIN: 1
DYSURIA: 0
LOSS OF SENSATION IN FEET: 0
ABDOMINAL PAIN: 0
WEAKNESS: 0
SHORTNESS OF BREATH: 0
ADENOPATHY: 0
FEVER: 0
CONSTIPATION: 0
EYE PAIN: 0

## 2023-10-13 ASSESSMENT — PAIN SCALES - GENERAL
PAINLEVEL: 0-NO PAIN
PAINLEVEL_OUTOF10: 0 - NO PAIN

## 2023-10-13 ASSESSMENT — LIFESTYLE VARIABLES: TOTAL SCORE: 0

## 2023-10-13 ASSESSMENT — PAIN - FUNCTIONAL ASSESSMENT: PAIN_FUNCTIONAL_ASSESSMENT: NO/DENIES PAIN

## 2023-10-13 NOTE — PROGRESS NOTES
Chief Complain  Chief Complaint   Patient presents with    New Patient Visit     For pain in left lower back, that have been going on for a few years and now getting worse. Deny neck and back surgery. No hip and knee pain. Images on file. Was treffered by dr ronel flores         History Of Present Illness  Obdulia Washington is a 73 y.o. female here for evaluation of left-sided low back pain. The patient has been experiencing these symptoms for last several year(s). The patient describes the pain as dull, aching. The patient's current pain score is 0 on a scale from 0-10. The pain is worsened by bending forward and household chores  and is alleviated by sitting and lying down. Since the start of the symptoms the pain has been worse.    The patient denies any fever, chills, weight loss, weakness, numbness, bladder/ bowel incontinence, history of IV drug abuse, recent trauma.    History breast cancer     Rx tried: Did chiropractic treatment 1-2 session a month for 3 months     Did dry needling - with temporary relief.     Tried PT long time ago for other pain issues    Past Medical History  She has a past medical history of Diarrhea (05/26/2023) and Personal history of other specified conditions (09/09/2019).    Surgical History  She has a past surgical history that includes Hysterectomy (03/19/2014); Other surgical history (10/17/2019); and Colonoscopy (12/30/2016).    Social History  She reports that she quit smoking about 41 years ago. Her smoking use included cigarettes. She has a 30.00 pack-year smoking history. She has been exposed to tobacco smoke. She has never used smokeless tobacco. She reports current alcohol use. She reports that she does not use drugs.    Family History  Family History   Problem Relation Name Age of Onset    Colon cancer Mother      Esophageal cancer Father          Allergies  Azithromycin    Review of Systems  Review of Systems   Constitutional:  Negative for fever.   HENT:  Negative for  "ear pain.    Eyes:  Negative for pain.   Respiratory:  Negative for shortness of breath.    Cardiovascular:  Negative for chest pain.   Gastrointestinal:  Negative for abdominal pain and constipation.   Endocrine: Negative for cold intolerance and heat intolerance.   Genitourinary:  Negative for dysuria.   Musculoskeletal:  Positive for back pain.   Skin:  Negative for rash.   Allergic/Immunologic: Negative for food allergies.   Neurological:  Negative for weakness.   Hematological:  Negative for adenopathy.   Psychiatric/Behavioral:  Negative for suicidal ideas.         Physical Exam  Physical Exam  Constitutional:       Appearance: Normal appearance.   HENT:      Head: Normocephalic.      Right Ear: External ear normal.      Left Ear: External ear normal.      Nose: Nose normal.      Mouth/Throat:      Mouth: Mucous membranes are moist.   Eyes:      Extraocular Movements: Extraocular movements intact.      Pupils: Pupils are equal, round, and reactive to light.   Cardiovascular:      Rate and Rhythm: Normal rate.      Pulses: Normal pulses.   Abdominal:      Palpations: Abdomen is soft.   Musculoskeletal:      Cervical back: Neck supple.      Lumbar back: No swelling, edema, deformity, signs of trauma, spasms, tenderness or bony tenderness. Decreased range of motion.        Back:    Skin:     General: Skin is warm.      Capillary Refill: Capillary refill takes less than 2 seconds.   Neurological:      General: No focal deficit present.      Mental Status: She is alert and oriented to person, place, and time.           Last Recorded Vitals  Blood pressure 160/67, pulse 75, temperature 36 °C (96.8 °F), resp. rate 18, height 1.6 m (5' 3\"), weight 78.5 kg (173 lb).    Reviewed Images  Reviewed and independently interpreted MRI Lumbar spine no significant spinal canal, or neural foraminal stenosis, lower lumbar facet arthrosis appeared to have some type II Modic changes at anterior aspect of L2-3    Reviewed Labs   " Latest Reference Range & Units 09/19/23 09:37   GLUCOSE 74 - 99 mg/dL 113 (H)   SODIUM 136 - 145 mmol/L 142   POTASSIUM 3.5 - 5.3 mmol/L 4.2   CHLORIDE 98 - 107 mmol/L 101   Bicarbonate 21 - 32 mmol/L 33 (H)   Anion Gap 10 - 20 mmol/L 12   Blood Urea Nitrogen 6 - 23 mg/dL 22   Creatinine 0.50 - 1.05 mg/dL 0.91   Calcium 8.6 - 10.6 mg/dL 10.1   Albumin 3.4 - 5.0 g/dL 4.4   Alkaline Phosphatase 33 - 136 U/L 31 (L)   ALT 7 - 45 U/L 24   AST 9 - 39 U/L 20   Bilirubin Total 0.0 - 1.2 mg/dL 0.5   HDL CHOLESTEROL mg/dL 67.3   Cholesterol/HDL Ratio  3.3   VLDL 0 - 40 mg/dL 28   TRIGLYCERIDES 0 - 149 mg/dL 138   (H): Data is abnormally high  (L): Data is abnormally low      Latest Reference Range & Units 09/19/23 09:37   WBC 4.4 - 11.3 x10E9/L 4.9   nRBC 0.0 - 0.0 /100 WBC 0.0   RBC 4.00 - 5.20 x10E12/L 4.16   HEMOGLOBIN 12.0 - 16.0 g/dL 11.6 (L)   HEMATOCRIT 36.0 - 46.0 % 36.9   MCV 80 - 100 fL 89   MCHC 32.0 - 36.0 g/dL 31.4 (L)   RED CELL DISTRIBUTION WIDTH 11.5 - 14.5 % 13.6   Platelets 150 - 450 x10E9/L 236   Neutrophils % 40.0 - 80.0 % 61.5   Immature Granulocytes %, Automated 0.0 - 0.9 % 0.2   Lymphocytes % 13.0 - 44.0 % 27.8   Monocytes % 2.0 - 10.0 % 7.3   Eosinophils % 0.0 - 6.0 % 2.4   Basophils % 0.0 - 2.0 % 0.8   Neutrophils Absolute 1.60 - 5.50 x10E9/L 3.03   Lymphocytes Absolute 0.80 - 3.00 x10E9/L 1.37   Monocytes Absolute 0.05 - 0.80 x10E9/L 0.36   Eosinophils Absolute 0.00 - 0.40 x10E9/L 0.12   Basophils Absolute 0.00 - 0.10 x10E9/L 0.04   (L): Data is abnormally low    Assessment/Plan     Obdulia Washington is a 73 y.o. female here for evaluation of left-sided low back pain.  She has been experiencing symptoms for several years, has been progressively getting worse.  Earlier this year she did 3 months of chiropractic manipulations, dry needling with limited short-term benefit.  Reviewed recently done MRI of her lumbar spine which did not reveal any severe spinal canal she did had some facet arthrosis and  mild neural foraminal stenosis, did reveal some type II Modic changes at L1-2, L2-3 which may be responsible for her low back pain.  I would recommend trial of physical therapy, may consider interventions if she does not respond to physical therapy.  However she would be leaving for [place] in couple of weeks and she would stay there for 6 months.  Asked her to see a local pain provider there for further treatment.  She continues to have pain when she returns we may consider doing Intracept procedure at L2-3 for potential vertebral genic back pain.          Mark Lazaro MD

## 2023-10-19 ENCOUNTER — TELEPHONE (OUTPATIENT)
Dept: PAIN MEDICINE | Facility: CLINIC | Age: 73
End: 2023-10-19

## 2023-10-19 DIAGNOSIS — G89.29 CHRONIC RIGHT-SIDED LOW BACK PAIN WITHOUT SCIATICA: Primary | ICD-10-CM

## 2023-10-19 DIAGNOSIS — M54.50 CHRONIC RIGHT-SIDED LOW BACK PAIN WITHOUT SCIATICA: Primary | ICD-10-CM

## 2023-10-19 DIAGNOSIS — M47.817 LUMBOSACRAL SPONDYLOSIS WITHOUT MYELOPATHY: ICD-10-CM

## 2024-05-22 ENCOUNTER — LAB (OUTPATIENT)
Dept: LAB | Facility: LAB | Age: 74
End: 2024-05-22
Payer: COMMERCIAL

## 2024-05-22 DIAGNOSIS — E55.9 VITAMIN D DEFICIENCY: ICD-10-CM

## 2024-05-22 DIAGNOSIS — D64.9 ANEMIA, UNSPECIFIED TYPE: ICD-10-CM

## 2024-05-22 DIAGNOSIS — E78.2 MIXED HYPERLIPIDEMIA: ICD-10-CM

## 2024-05-22 DIAGNOSIS — Z00.00 ROUTINE GENERAL MEDICAL EXAMINATION AT HEALTH CARE FACILITY: ICD-10-CM

## 2024-05-22 LAB
25(OH)D3 SERPL-MCNC: 57 NG/ML (ref 30–100)
ALBUMIN SERPL BCP-MCNC: 4.4 G/DL (ref 3.4–5)
ALP SERPL-CCNC: 33 U/L (ref 33–136)
ALT SERPL W P-5'-P-CCNC: 24 U/L (ref 7–45)
ANION GAP SERPL CALC-SCNC: 17 MMOL/L (ref 10–20)
AST SERPL W P-5'-P-CCNC: 26 U/L (ref 9–39)
BASOPHILS # BLD AUTO: 0.04 X10*3/UL (ref 0–0.1)
BASOPHILS NFR BLD AUTO: 0.8 %
BILIRUB SERPL-MCNC: 0.5 MG/DL (ref 0–1.2)
BUN SERPL-MCNC: 23 MG/DL (ref 6–23)
CALCIUM SERPL-MCNC: 9.9 MG/DL (ref 8.6–10.6)
CHLORIDE SERPL-SCNC: 100 MMOL/L (ref 98–107)
CHOLEST SERPL-MCNC: 248 MG/DL (ref 0–199)
CHOLESTEROL/HDL RATIO: 3.7
CO2 SERPL-SCNC: 29 MMOL/L (ref 21–32)
CREAT SERPL-MCNC: 0.95 MG/DL (ref 0.5–1.05)
EGFRCR SERPLBLD CKD-EPI 2021: 63 ML/MIN/1.73M*2
EOSINOPHIL # BLD AUTO: 0.12 X10*3/UL (ref 0–0.4)
EOSINOPHIL NFR BLD AUTO: 2.3 %
ERYTHROCYTE [DISTWIDTH] IN BLOOD BY AUTOMATED COUNT: 14.1 % (ref 11.5–14.5)
EST. AVERAGE GLUCOSE BLD GHB EST-MCNC: 123 MG/DL
GLUCOSE SERPL-MCNC: 104 MG/DL (ref 74–99)
HBA1C MFR BLD: 5.9 %
HCT VFR BLD AUTO: 39 % (ref 36–46)
HDLC SERPL-MCNC: 67.3 MG/DL
HGB BLD-MCNC: 11.9 G/DL (ref 12–16)
IMM GRANULOCYTES # BLD AUTO: 0.01 X10*3/UL (ref 0–0.5)
IMM GRANULOCYTES NFR BLD AUTO: 0.2 % (ref 0–0.9)
LDLC SERPL CALC-MCNC: 148 MG/DL
LYMPHOCYTES # BLD AUTO: 1.82 X10*3/UL (ref 0.8–3)
LYMPHOCYTES NFR BLD AUTO: 35.1 %
MCH RBC QN AUTO: 27.7 PG (ref 26–34)
MCHC RBC AUTO-ENTMCNC: 30.5 G/DL (ref 32–36)
MCV RBC AUTO: 91 FL (ref 80–100)
MONOCYTES # BLD AUTO: 0.43 X10*3/UL (ref 0.05–0.8)
MONOCYTES NFR BLD AUTO: 8.3 %
NEUTROPHILS # BLD AUTO: 2.77 X10*3/UL (ref 1.6–5.5)
NEUTROPHILS NFR BLD AUTO: 53.3 %
NON HDL CHOLESTEROL: 181 MG/DL (ref 0–149)
NRBC BLD-RTO: 0 /100 WBCS (ref 0–0)
PLATELET # BLD AUTO: 264 X10*3/UL (ref 150–450)
POTASSIUM SERPL-SCNC: 4 MMOL/L (ref 3.5–5.3)
PROT SERPL-MCNC: 7.6 G/DL (ref 6.4–8.2)
RBC # BLD AUTO: 4.29 X10*6/UL (ref 4–5.2)
SODIUM SERPL-SCNC: 142 MMOL/L (ref 136–145)
TRIGL SERPL-MCNC: 163 MG/DL (ref 0–149)
VLDL: 33 MG/DL (ref 0–40)
WBC # BLD AUTO: 5.2 X10*3/UL (ref 4.4–11.3)

## 2024-05-22 PROCEDURE — 80061 LIPID PANEL: CPT

## 2024-05-22 PROCEDURE — 82728 ASSAY OF FERRITIN: CPT

## 2024-05-22 PROCEDURE — 80053 COMPREHEN METABOLIC PANEL: CPT

## 2024-05-22 PROCEDURE — 83550 IRON BINDING TEST: CPT

## 2024-05-22 PROCEDURE — 85025 COMPLETE CBC W/AUTO DIFF WBC: CPT

## 2024-05-22 PROCEDURE — 36415 COLL VENOUS BLD VENIPUNCTURE: CPT

## 2024-05-22 PROCEDURE — 82306 VITAMIN D 25 HYDROXY: CPT

## 2024-05-22 PROCEDURE — 83036 HEMOGLOBIN GLYCOSYLATED A1C: CPT

## 2024-05-22 PROCEDURE — 82607 VITAMIN B-12: CPT

## 2024-05-22 PROCEDURE — 83540 ASSAY OF IRON: CPT

## 2024-05-22 PROCEDURE — 82746 ASSAY OF FOLIC ACID SERUM: CPT

## 2024-05-22 NOTE — PROGRESS NOTES
"Subjective   Patient ID: Odbulia Washington is a 73 y.o. female who presents for Follow-up (6 mo fuv htn, would like to discuss her back issues and an \"age spot\" on her back that is falling off if it could be looked at, discuss upcoming appt. With cancer doctor).    HPI     Back Pain: Patient presents for evaluation of low back problems. Symptoms have been present for several years and include pain in the lower back (aching and sharp in character; 8/10 in severity).  Treatments so far initiated by patient:  physiotherapy . Previous lower back problems: none. Previous workup:  MRI 09/19/2023 . The patient went to physiotherapy twice a week from November 2023 to March 2024. She was with a PT who was a travel PT and had left in the middle of her progress and ended up with a new PT. She mentions that she does not see an progress or difference in her back pain with the new PT.    The patient has a spot on her back that she mentions is flaking off. She is unsure if the spot is a concern and wants a skin check to see if it may be a concern.    The patient denies any changes in vision, hearing or dental.     The patient maintains they do not have any chest pain, chest tightness or shortness of breath.    They do not experience nausea, emesis, changes in bowel movements or dyspepsia.    The patient's colonoscopy is up to date.    The patient's mammogram is up to date.    The patient's vaccinations are up to date.      Review of Systems   Constitutional: Negative.    HENT: Negative.     Eyes: Negative.    Respiratory: Negative.     Cardiovascular: Negative.    Gastrointestinal: Negative.    Endocrine: Negative.    Genitourinary: Negative.    Musculoskeletal:  Positive for back pain.   Skin:  Positive for wound.   Allergic/Immunologic: Negative.    Neurological: Negative.    Hematological: Negative.    Psychiatric/Behavioral: Negative.         Objective   /66 (BP Location: Right arm, Patient Position: Sitting, BP Cuff " Size: Large adult)   Pulse 73   Temp 36.4 °C (97.6 °F) (Temporal)   Wt 78.7 kg (173 lb 6.4 oz)   SpO2 98%   BMI 30.72 kg/m²     Physical Exam  Constitutional:       Appearance: Normal appearance.   HENT:      Head: Normocephalic and atraumatic.      Nose: Nose normal.   Eyes:      Extraocular Movements: Extraocular movements intact.      Conjunctiva/sclera: Conjunctivae normal.      Pupils: Pupils are equal, round, and reactive to light.   Cardiovascular:      Rate and Rhythm: Normal rate and regular rhythm.      Pulses: Normal pulses.      Heart sounds: Normal heart sounds.   Pulmonary:      Effort: Pulmonary effort is normal.      Breath sounds: Normal breath sounds.   Abdominal:      General: Bowel sounds are normal.      Palpations: Abdomen is soft.   Genitourinary:     General: Normal vulva.      Rectum: Normal.   Musculoskeletal:         General: Normal range of motion.      Cervical back: Normal range of motion and neck supple.   Skin:     General: Skin is warm.      Comments: Keratosis spot on her back.   Neurological:      Mental Status: She is alert and oriented to person, place, and time.   Psychiatric:         Mood and Affect: Mood normal.         Behavior: Behavior normal.         Thought Content: Thought content normal.         Judgment: Judgment normal.         Assessment/Plan   Problem List Items Addressed This Visit             ICD-10-CM    Benign essential HTN - Primary I10    Allergic rhinitis J30.9    Elevated fasting blood sugar R73.01    Hyperlipidemia E78.5    Relevant Orders    Lipid Panel    Comprehensive Metabolic Panel    CBC and Auto Differential    PVC (premature ventricular contraction) I49.3    Vitamin D deficiency E55.9    Relevant Orders    Vitamin D 25-Hydroxy,Total (for eval of Vitamin D levels)    Chronic bilateral low back pain M54.50, G89.29    Medicare annual wellness visit, subsequent Z00.00    Relevant Orders    Follow Up In [facility] - PCP - Medicare Annual      Other Visit Diagnoses         Codes    Elevated blood sugar level     R73.9    Relevant Orders    Hemoglobin A1C    Other fatigue     R53.83    Relevant Orders    TSH with reflex to Free T4 if abnormal    Routine general medical examination at health care facility     Z00.00    Anemia, unspecified type     D64.9    Relevant Orders    Iron and TIBC (Completed)    Vitamin B12 (Completed)    Ferritin (Completed)    Folate (Completed)    Paresthesia     R20.2    Chronic bilateral back pain, unspecified back location     M54.9, G89.29    Relevant Orders    Referral to Physical Therapy               1. Benign essential HTN        2. Vitamin D deficiency  Vitamin D 25-Hydroxy,Total (for eval of Vitamin D levels)      3. Elevated blood sugar level  Hemoglobin A1C      4. Other fatigue  TSH with reflex to Free T4 if abnormal      5. Hyperlipidemia, unspecified hyperlipidemia type  Lipid Panel    Comprehensive Metabolic Panel    CBC and Auto Differential      6. Routine general medical examination at [facility] facility  Follow Up In [facility] - PCP - Health Maintenance      7. Anemia, unspecified type  Iron and TIBC    Vitamin B12    Ferritin    Folate      8. Paresthesia        9. Medicare annual wellness visit, subsequent  Follow Up In [facility] - PCP - Medicare Annual      10. Chronic bilateral back pain, unspecified back location  Referral to Physical Therapy      11. PVC (premature ventricular contraction)        12. Allergic rhinitis, unspecified seasonality, unspecified trigger        13. Elevated fasting blood sugar        14. Chronic bilateral low back pain without sciatica          1.  Hypertension    Blood pressure normal today 115/66.    Please stay on current blood pressure medications by lowering your blood pressure we are reducing risk for heart attack and stroke continue on the losartan continue on the amlodipine and: Continue on the chlorthalidone    2.  Mild elevated  cholesterol    We discussed the results of your recent labs.  Kidney functions normal liver enzymes are normal electrolytes are normal cholesterol is slightly elevated.  I am thinking this is most likely due to lessened activity because of your back.    Please keep eating a heart healthy diet as you have been a good goal 5-7 servings of fresh fruit and vegetable every day along with lean proteins avoiding simple sugars and fast foods    Keep being active 30 minutes of activity a day is ideal certainly would call if you have chest pains with your activity    Will repeat your cholesterol in 6 months prior to your next appointment    3.  History of breast cancer.  Continue on the anastrozole.  Keep your appointment with Dr. Stephens to is a new cancer specialist to you I do think he will be able to take over the future care of your previous history.    4.  Irregular skin lesion.  You have a benign seborrheic keratosis that is starting to pull away from your skin and bleeding a little bit I think once it completely pulls away and the skin heals nothing more to be done    5.  Lumbar degenerative disc disease with back pain.  You went to physical therapy while you are in [place] but did not make much improvement.  You have been seen by pain management.  I am encouraging you to see a new physical therapist here at [facility].  Please see Traci I do think she will be able to give you exercises that will not only lessen your pain but improve your flexibility and strength    6.  Anemia.  You are just slightly anemic we are adding some extra labs to the blood test you just had done we will contact you once those results come in    7.  If you otherwise stay healthy I will see you in 6 months for an annual wellness visit but am happy to see you sooner if needed    Follow-up in 6 months or sooner if there are any concerns.    Scribe Attestation  By signing my name below, Mary STEELE, [place]   attest that this  documentation has been prepared under the direction and in the presence of Johnny Arora MD.    This note has been transcribed using a medical scribe and there is a possibility of unintentional typing misprints.

## 2024-05-24 ENCOUNTER — OFFICE VISIT (OUTPATIENT)
Dept: PRIMARY CARE | Facility: CLINIC | Age: 74
End: 2024-05-24
Payer: MEDICARE

## 2024-05-24 VITALS
OXYGEN SATURATION: 98 % | TEMPERATURE: 97.6 F | WEIGHT: 173.4 LBS | BODY MASS INDEX: 30.72 KG/M2 | DIASTOLIC BLOOD PRESSURE: 66 MMHG | HEART RATE: 73 BPM | SYSTOLIC BLOOD PRESSURE: 115 MMHG

## 2024-05-24 DIAGNOSIS — I10 BENIGN ESSENTIAL HTN: Primary | ICD-10-CM

## 2024-05-24 DIAGNOSIS — E55.9 VITAMIN D DEFICIENCY: ICD-10-CM

## 2024-05-24 DIAGNOSIS — G89.29 CHRONIC BILATERAL BACK PAIN, UNSPECIFIED BACK LOCATION: ICD-10-CM

## 2024-05-24 DIAGNOSIS — Z00.00 MEDICARE ANNUAL WELLNESS VISIT, SUBSEQUENT: ICD-10-CM

## 2024-05-24 DIAGNOSIS — D64.9 ANEMIA, UNSPECIFIED TYPE: ICD-10-CM

## 2024-05-24 DIAGNOSIS — J30.9 ALLERGIC RHINITIS, UNSPECIFIED SEASONALITY, UNSPECIFIED TRIGGER: ICD-10-CM

## 2024-05-24 DIAGNOSIS — R73.9 ELEVATED BLOOD SUGAR LEVEL: ICD-10-CM

## 2024-05-24 DIAGNOSIS — I49.3 PVC (PREMATURE VENTRICULAR CONTRACTION): ICD-10-CM

## 2024-05-24 DIAGNOSIS — G89.29 CHRONIC BILATERAL LOW BACK PAIN WITHOUT SCIATICA: ICD-10-CM

## 2024-05-24 DIAGNOSIS — M54.9 CHRONIC BILATERAL BACK PAIN, UNSPECIFIED BACK LOCATION: ICD-10-CM

## 2024-05-24 DIAGNOSIS — R53.83 OTHER FATIGUE: ICD-10-CM

## 2024-05-24 DIAGNOSIS — Z00.00 ROUTINE GENERAL MEDICAL EXAMINATION AT HEALTH CARE FACILITY: ICD-10-CM

## 2024-05-24 DIAGNOSIS — M54.50 CHRONIC BILATERAL LOW BACK PAIN WITHOUT SCIATICA: ICD-10-CM

## 2024-05-24 DIAGNOSIS — R20.2 PARESTHESIA: ICD-10-CM

## 2024-05-24 DIAGNOSIS — R73.01 ELEVATED FASTING BLOOD SUGAR: ICD-10-CM

## 2024-05-24 DIAGNOSIS — E78.5 HYPERLIPIDEMIA, UNSPECIFIED HYPERLIPIDEMIA TYPE: ICD-10-CM

## 2024-05-24 LAB
FERRITIN SERPL-MCNC: 176 NG/ML (ref 8–150)
FOLATE SERPL-MCNC: 15.6 NG/ML
IRON SATN MFR SERPL: 16 % (ref 25–45)
IRON SERPL-MCNC: 53 UG/DL (ref 35–150)
TIBC SERPL-MCNC: 337 UG/DL (ref 240–445)
UIBC SERPL-MCNC: 284 UG/DL (ref 110–370)
VIT B12 SERPL-MCNC: 523 PG/ML (ref 211–911)

## 2024-05-24 PROCEDURE — 1123F ACP DISCUSS/DSCN MKR DOCD: CPT | Performed by: FAMILY MEDICINE

## 2024-05-24 PROCEDURE — 1160F RVW MEDS BY RX/DR IN RCRD: CPT | Performed by: FAMILY MEDICINE

## 2024-05-24 PROCEDURE — 3078F DIAST BP <80 MM HG: CPT | Performed by: FAMILY MEDICINE

## 2024-05-24 PROCEDURE — 99214 OFFICE O/P EST MOD 30 MIN: CPT | Performed by: FAMILY MEDICINE

## 2024-05-24 PROCEDURE — 1159F MED LIST DOCD IN RCRD: CPT | Performed by: FAMILY MEDICINE

## 2024-05-24 PROCEDURE — 3074F SYST BP LT 130 MM HG: CPT | Performed by: FAMILY MEDICINE

## 2024-05-24 PROCEDURE — 1036F TOBACCO NON-USER: CPT | Performed by: FAMILY MEDICINE

## 2024-05-24 ASSESSMENT — ENCOUNTER SYMPTOMS
ENDOCRINE NEGATIVE: 1
NEUROLOGICAL NEGATIVE: 1
EYES NEGATIVE: 1
ALLERGIC/IMMUNOLOGIC NEGATIVE: 1
CONSTITUTIONAL NEGATIVE: 1
GASTROINTESTINAL NEGATIVE: 1
RESPIRATORY NEGATIVE: 1
CARDIOVASCULAR NEGATIVE: 1
BACK PAIN: 1
PSYCHIATRIC NEGATIVE: 1
WOUND: 1
HEMATOLOGIC/LYMPHATIC NEGATIVE: 1

## 2024-05-24 NOTE — PATIENT INSTRUCTIONS
1.  Hypertension    Blood pressure normal today 115/66.    Please stay on current blood pressure medications by lowering your blood pressure we are reducing risk for heart attack and stroke continue on the losartan continue on the amlodipine and: Continue on the chlorthalidone    2.  Mild elevated cholesterol    We discussed the results of your recent labs.  Kidney functions normal liver enzymes are normal electrolytes are normal cholesterol is slightly elevated.  I am thinking this is most likely due to lessened activity because of your back.    Please keep eating a heart healthy diet as you have been a good goal 5-7 servings of fresh fruit and vegetable every day along with lean proteins avoiding simple sugars and fast foods    Keep being active 30 minutes of activity a day is ideal certainly would call if you have chest pains with your activity    Will repeat your cholesterol in 6 months prior to your next appointment    3.  History of breast cancer.  Continue on the anastrozole.  Keep your appointment with Dr. Stephens to is a new cancer specialist to you I do think he will be able to take over the future care of your previous history.    4.  Irregular skin lesion.  You have a benign seborrheic keratosis that is starting to pull away from your skin and bleeding a little bit I think once it completely pulls away and the skin heals nothing more to be done    5.  Lumbar degenerative disc disease with back pain.  You went to physical therapy while you are in [place] but did not make much improvement.  You have been seen by pain management.  I am encouraging you to see a new physical therapist here at [facility].  Please see Traci I do think she will be able to give you exercises that will not only lessen your pain but improve your flexibility and strength    6.  Anemia.  You are just slightly anemic we are adding some extra labs to the blood test you just had done we will contact you once those  results come in    7.  If you otherwise stay healthy I will see you in 6 months for an annual wellness visit but am happy to see you sooner if needed

## 2024-06-24 ENCOUNTER — APPOINTMENT (OUTPATIENT)
Dept: HEMATOLOGY/ONCOLOGY | Facility: CLINIC | Age: 74
End: 2024-06-24
Payer: MEDICARE

## 2024-06-25 ENCOUNTER — EVALUATION (OUTPATIENT)
Dept: PHYSICAL THERAPY | Facility: CLINIC | Age: 74
End: 2024-06-25
Payer: MEDICARE

## 2024-06-25 DIAGNOSIS — M54.9 CHRONIC BILATERAL BACK PAIN, UNSPECIFIED BACK LOCATION: ICD-10-CM

## 2024-06-25 DIAGNOSIS — G89.29 CHRONIC BILATERAL BACK PAIN, UNSPECIFIED BACK LOCATION: ICD-10-CM

## 2024-06-25 PROCEDURE — 97110 THERAPEUTIC EXERCISES: CPT | Mod: GP | Performed by: PHYSICAL THERAPIST

## 2024-06-25 PROCEDURE — 97161 PT EVAL LOW COMPLEX 20 MIN: CPT | Mod: GP | Performed by: PHYSICAL THERAPIST

## 2024-06-25 ASSESSMENT — PAIN - FUNCTIONAL ASSESSMENT: PAIN_FUNCTIONAL_ASSESSMENT: 0-10

## 2024-06-25 ASSESSMENT — ENCOUNTER SYMPTOMS
DEPRESSION: 0
LOSS OF SENSATION IN FEET: 0
OCCASIONAL FEELINGS OF UNSTEADINESS: 0

## 2024-06-25 ASSESSMENT — PAIN SCALES - GENERAL: PAINLEVEL_OUTOF10: 0 - NO PAIN

## 2024-06-25 NOTE — PROGRESS NOTES
"Physical Therapy    Physical Therapy Evaluation    Patient Name: Obdulia Washington  MRN: [id number]  Today's Date: 6/25/2024  Payor: MEDICARE / Plan: MEDICARE PART A AND B / Product Type: *No Product type* /   Time Calculation  Start Time: 1258  Stop Time: 1333  Time Calculation (min): 35 min  PT Evaluation Time Entry  PT Evaluation (Low) Time Entry: 25  PT Therapeutic Procedures Time Entry  Therapeutic Exercise Time Entry: 10                     Current Problem  1. Chronic bilateral back pain, unspecified back location  Referral to Physical Therapy           Problem List Items Addressed This Visit    None  Visit Diagnoses         Codes    Chronic bilateral back pain, unspecified back location     M54.9, G89.29            SUBJECTIVE  Subjective   General  Reason for Referral: back pain  Referred By: Dr. Johnny Arora  Past Medical History Relevant to Rehab: hx breast cancer, HTN  Precautions  Precautions  Precautions Comment: none       Pain  Pain Assessment: 0-10  0-10 (Numeric) Pain Score: 0 - No pain  Pain Location: Back    SUBJECTIVE:   Chief complaint: back pain     Patient reports long standing history of low back pain for the last few years which presented gradually and has been progressively worsening. States that she is unable to tolerate more than 20 min of yardwork these days.   Patient reports she gets short term relief with dry needling but nothing has resolved pain completely. Denies N&T and BLE pain. Arrives to PT this date to address ongoing pain impacting her ability to function at prior level.     Pain is located (B) lumbar spine L>R, is intermittent and variable, ranging from 0-8/10 on VAS. Described as \"ache\". Aggravated by: yard work, standing, cooking, cleaning, vacuuming, folding laundry; Alleviated by: sitting for about 15 to 20 minutes, ibuprofen    Prior level of function: Ind, pain free, and unrestricted with ADLs, HHCs, self care, work tasks, and functional mobility about home and community. " "    Current functional level:  Pain limits the patient's ability to stand, lift, bend, walk  Home set up: no concerns    Patient Goal for Therapy: able to accomplish daily tasks and work in yard without pain    Work status: retired    Prior treatment for current condition: chiro, PT, dry needling     Relevant Imagin23 MRI of lumbar spine Impression \"Unchanged lumbar scoliosis with mild lumbar spondylosis and lower  lumbar facet arthropathy. There is no spinal canal stenosis at any level. There is mild scattered neural foraminal narrowing as described above. \"     OBJECTIVE    Lumbar ROM:  Date:     Flexion 85%   Extension 75% PDM    RIGHT LEFT   Side Bend 100% 100%   Rotation 100% 100%      Lower Extremity Strength:  Date     Myotome RIGHT LEFT   Hip Flexion L1,2 4/5 4/5   Hip Extension  --- 3+/5 3+/5   Hip Abduction  --- 3+/5 3+/5   Hip Adduction  --- 4/5 3+/5   Knee Extension L3 5/5 5/5   Knee Flexion S2 5/5 5/5     Outcome Measures:  Other Measures  Oswestry Disablity Index (AURELIO): 10/50 (20%)     TREATMENT:  TherEx: Patient Education, HEP completed, provided, and reviewed     Access Code: [email or web address]  URL: [email or web address]  Date: 2024  Prepared by: Traci Soria    Exercises  - Supine Lower Trunk Rotation  - 1 x daily - 7 x weekly - 2 sets - 10 reps  - Supine Transversus Abdominis Bracing - Hands on Stomach  - 1 x daily - 7 x weekly - 2 sets - 10 reps - 5 seconds  hold  - Supine Hip Adduction Isometric with Ball  - 1 x daily - 7 x weekly - 2 sets - 10 reps - 5 seconds  hold  - Hooklying Clamshell with Resistance  - 1 x daily - 7 x weekly - 2 sets - 10 reps  - Hooklying Isometric Clamshell  - 1 x daily - 7 x weekly - 2 sets - 10 reps    ASSESSEMENT  PT Assessment  PT Assessment Results: Decreased strength, Decreased range of motion, Decreased endurance, Impaired balance, Decreased mobility, Impaired tone, Pain  Rehab Prognosis: Good  Barriers to Discharge: chronicity of " condition    Patient presents to PT this date with S&S consistent with mechanical low back pain. They demo loss of ROM, strength, flexibility, jt mobility, soft tissue mobility, impaired body mechanics, and postural control contributing to ongoing pain and difficulty with sitting, standing, walking, curb and stair negotiation, and transfers from chair, bed, and car. They will benefit from skilled PT intervention to address deficits and limitations listed above in order to return patient to PLOF without restriction.      The physical therapy prognosis is good for the patient to achieve their goals.   The pt tolerated therapy treatment today well with no adverse effects.    Clinical presentation: Stable and/or uncomplicated characteristics  Level of clinical decision making is Low      PLAN  OP PT Plan  Treatment/Interventions: Blood flow restriction therapy, Cryotherapy, Dry needling, Education/ Instruction, Electrical stimulation, Gait training, Hot pack, Manual therapy, Mechanical traction, Neuromuscular re-education, Taping techniques, Therapeutic activities, Therapeutic exercises, Ultrasound, Vasopneumatic device  PT Plan: Skilled PT  PT Frequency: 2 times per week  Duration: 6-8 weeks  Rehab Potential: Good  Plan of Care Agreement: Patient      Goals:  Active       PT Problem       AURELIO 15% or less       Start:  06/25/24    Expected End:  08/24/24            Pt will demo improved MMT to at least 4/5 on 0-5 point scale in BLE for improved strength and stability, and improved ease with transfers, lifting/carrying, and proper mechanics with ADL's & IADL's.         Start:  06/25/24    Expected End:  08/24/24            Patient will be able to lift 10 lbs from floor to waist without pain and good mechanics to allow for return to PLOF and ability to complete ADLs and HHCs without restriction.         Start:  06/25/24    Expected End:  08/24/24            Patient will demonstrate improved standing tolerance to 60+ mins,  independently, with good mechanics, and without assistance to allow for improved safety and function with ADLs, HHCs, and all functional mobility.          Start:  06/25/24    Expected End:  08/24/24            Patient will demonstrate improved sitting tolerance to 60+ mins, independently, with maintenance of proper posture, and without pain during or with return to standing to improve ability to complete ADLs and HHCs at prior level        Start:  06/25/24    Expected End:  08/24/24            Patient will be able to tolerate continuous ambulation for at least 60+ minutes or greater without pain or limitation from prior level of function to improve safety and function with community mobility.         Start:  06/25/24    Expected End:  08/24/24

## 2024-06-26 ENCOUNTER — TREATMENT (OUTPATIENT)
Dept: PHYSICAL THERAPY | Facility: CLINIC | Age: 74
End: 2024-06-26
Payer: MEDICARE

## 2024-06-26 DIAGNOSIS — G89.29 CHRONIC BILATERAL LOW BACK PAIN WITHOUT SCIATICA: Primary | ICD-10-CM

## 2024-06-26 DIAGNOSIS — M54.50 CHRONIC BILATERAL LOW BACK PAIN WITHOUT SCIATICA: Primary | ICD-10-CM

## 2024-06-26 PROCEDURE — 97110 THERAPEUTIC EXERCISES: CPT | Mod: GP | Performed by: PHYSICAL THERAPIST

## 2024-06-26 ASSESSMENT — PAIN SCALES - GENERAL: PAINLEVEL_OUTOF10: 0 - NO PAIN

## 2024-06-26 ASSESSMENT — PAIN - FUNCTIONAL ASSESSMENT: PAIN_FUNCTIONAL_ASSESSMENT: 0-10

## 2024-06-26 NOTE — PROGRESS NOTES
Physical Therapy    Physical Therapy Treatment    Patient Name: Obdulia Washington  MRN: [id number]  Today's Date: 6/26/2024  Time Calculation  Start Time: 0835  Stop Time: 0914  Time Calculation (min): 39 min     PT Therapeutic Procedures Time Entry  Therapeutic Exercise Time Entry: 39                   Current Problem  1. Chronic bilateral low back pain without sciatica          Problem List Items Addressed This Visit             ICD-10-CM    Chronic bilateral low back pain - Primary M54.50, G89.29       SUBJECTIVE  Subjective   General  Reason for Referral: back pain  Referred By: Dr. Johnny Arora  Past Medical History Relevant to Rehab: hx breast cancer, HTN  Precautions  Precautions  Precautions Comment: none       Pain  Pain Assessment: 0-10  0-10 (Numeric) Pain Score: 0 - No pain  Pain Location: Back    Patient reports that she is doing well. Denies pain in her back at start of session. States HEP went well last night, no questions or complaints.   Patient reports compliance with HEP.     VISIT NUMBER: 2  EVAL 6/25/24   Re-Check due 7/25/24   Auth not req, BMN, medicare     Treatments:  TherEx:  Stepper 5 min   IB 2x1 min   HSS 2x30 seconds   Shuttle 6 bands, 3x10   SL Shuttle 4 bands, 3x10 (B)   Seated Ball Roll Out 3 min ea Fwd, Side-Side   Side Stepping Red, 2 laps   Monster walks Red, 2 laps   Stand Hip ABD/Ext Red, 2 min ea        Objective   No objective measures assessed this visit       Assessment:     Able to initiate interventions to improve BLE strength and lumbar mobility to reduce back pain impacting daily life. Patient challenged d/t weakness but no reports of pain. Tolerates initiation of interventions well.     This session exercises were progressed (p) or added (NEW) as documented in treatment section.  Pt required minimal to moderate cues and demonstration to complete exercises with proper form and responded without adverse effects.        Plan:     Continue to progress treatment to address  strength, motion, jt mobility, soft tissue mobility, and flexibility deficits impacting patient's return to PLOF unrestricted and symptom free.     Goals:  Active       PT Problem       AURELIO 15% or less       Start:  06/25/24    Expected End:  08/24/24            Pt will demo improved MMT to at least 4/5 on 0-5 point scale in BLE for improved strength and stability, and improved ease with transfers, lifting/carrying, and proper mechanics with ADL's & IADL's.         Start:  06/25/24    Expected End:  08/24/24            Patient will be able to lift 10 lbs from floor to waist without pain and good mechanics to allow for return to PLOF and ability to complete ADLs and HHCs without restriction.         Start:  06/25/24    Expected End:  08/24/24            Patient will demonstrate improved standing tolerance to 60+ mins, independently, with good mechanics, and without assistance to allow for improved safety and function with ADLs, HHCs, and all functional mobility.          Start:  06/25/24    Expected End:  08/24/24            Patient will demonstrate improved sitting tolerance to 60+ mins, independently, with maintenance of proper posture, and without pain during or with return to standing to improve ability to complete ADLs and HHCs at prior level        Start:  06/25/24    Expected End:  08/24/24            Patient will be able to tolerate continuous ambulation for at least 60+ minutes or greater without pain or limitation from prior level of function to improve safety and function with community mobility.         Start:  06/25/24    Expected End:  08/24/24

## 2024-06-27 ENCOUNTER — OFFICE VISIT (OUTPATIENT)
Dept: HEMATOLOGY/ONCOLOGY | Facility: CLINIC | Age: 74
End: 2024-06-27
Payer: MEDICARE

## 2024-06-27 ENCOUNTER — EDUCATION (OUTPATIENT)
Dept: HEMATOLOGY/ONCOLOGY | Facility: CLINIC | Age: 74
End: 2024-06-27

## 2024-06-27 VITALS
HEART RATE: 79 BPM | BODY MASS INDEX: 31.01 KG/M2 | RESPIRATION RATE: 16 BRPM | WEIGHT: 175.04 LBS | DIASTOLIC BLOOD PRESSURE: 75 MMHG | SYSTOLIC BLOOD PRESSURE: 136 MMHG | TEMPERATURE: 97.2 F | OXYGEN SATURATION: 98 %

## 2024-06-27 DIAGNOSIS — C50.912 INFILTRATING DUCTAL CARCINOMA OF LEFT BREAST (MULTI): Primary | ICD-10-CM

## 2024-06-27 PROCEDURE — 1159F MED LIST DOCD IN RCRD: CPT | Performed by: INTERNAL MEDICINE

## 2024-06-27 PROCEDURE — 1126F AMNT PAIN NOTED NONE PRSNT: CPT | Performed by: INTERNAL MEDICINE

## 2024-06-27 PROCEDURE — 99214 OFFICE O/P EST MOD 30 MIN: CPT | Performed by: INTERNAL MEDICINE

## 2024-06-27 PROCEDURE — 1123F ACP DISCUSS/DSCN MKR DOCD: CPT | Performed by: INTERNAL MEDICINE

## 2024-06-27 PROCEDURE — 99204 OFFICE O/P NEW MOD 45 MIN: CPT | Performed by: INTERNAL MEDICINE

## 2024-06-27 PROCEDURE — 3078F DIAST BP <80 MM HG: CPT | Performed by: INTERNAL MEDICINE

## 2024-06-27 PROCEDURE — 3075F SYST BP GE 130 - 139MM HG: CPT | Performed by: INTERNAL MEDICINE

## 2024-06-27 RX ORDER — MAGNESIUM 250 MG
TABLET ORAL
COMMUNITY

## 2024-06-27 RX ORDER — METRONIDAZOLE 10 MG/G
GEL TOPICAL
COMMUNITY
Start: 2024-06-19

## 2024-06-27 RX ORDER — ANASTROZOLE 1 MG/1
1 TABLET ORAL DAILY
Qty: 90 TABLET | Refills: 3 | Status: SHIPPED | OUTPATIENT
Start: 2024-06-27

## 2024-06-27 ASSESSMENT — PAIN SCALES - GENERAL: PAINLEVEL: 0-NO PAIN

## 2024-06-27 NOTE — PROGRESS NOTES
Cancer History:          Breast         AJCC Edition: 8th (AJCC), Diagnosis Date: 15-May-2018, IA, cT1a cN0 cM0 G2     Treatment Synopsis:    Patient presented with an abnormal screening mammogram found to have left side abnormality.    She underwent a biopsy on 19 showing a infiltrative ductal carcinoma showing  ER 80%,  VT negative and HER2 negative.   On 10/3/19 she underwent left lumpectomy with a 0.5 cm tumor removed and 2 negative sentinel lymph nodes. Grade 2 was confirmed.      Tumor board recommendations: No oncotype. Endocrine therapy. XRT consult (consider)     Family Cancer History:  Maternal Grandmother had breast cancer.   Maternal Aunt had colon cancer  Mother had colon cancer,  at age 79           History of Present Illness:      ID Statement:    MARIE CHURCHILL is a 73 year old Female        Chief Complaint: breast cancer follow up   Interval History:    Patient presents for a breast cancer follow up visit. States arthritis has been exacerbated being on anastrozole. She had one session of acupuncture, noticed some  relief. Would like to stay on anastrozole at this time. States she is doing well overall. Denies any other concerns at this time.      Her energy is good. Denies issues with sleep or fatigue     She denies any vision changes or headache issues, dizziness or loss of balance     She denies any shortness of breath, cough, or chest pain     She denies any new or unexplained bone aches or pains     She denies any skin lesions or masses, oral sores lesions or infections     She reports a normal appetite and normal bowel movements, and normal urination. Denies any new stomach pains, nausea, or vomiting  The patient was evaluated on 2024 regarding history of pT1a, N0 M0 ER/VT positive infiltrating ductal carcinoma of left breast.  She was placed on Arimidex 1 mg p.o. daily 2019.  The patient is tolerating well denied any new symptoms.           Review of Systems:    Review of Systems:    ROS 14 points performed, See HPI for exceptions        ·  System Review All other systems have been reviewed and are negative for complaint.            Allergies and Intolerances:       Allergies:         epinephrine: Drug, Other, Active         Zithromax: Drug, Rash, Hives/Urticaria, Active     Outpatient Medication Profile:  * Patient Currently Takes Medications as of 26-Jun-2023 10:37 documented in Structured Notes         losartan 50 mg oral tablet : Last Dose Taken:  , 1 tab(s) orally once a day         Aleve 220 mg oral tablet: Last Dose Taken:           omeprazole 20 mg oral delayed release capsule: Last Dose Taken:  , 1 cap(s)  orally once a day         eye caps: Last Dose Taken:           Vitamin B-100 oral tablet: Last Dose Taken:  , 1 tab(s) orally once a day             Medical History:         Encounter for medication management: ICD-10: Z79.899, Status:  Active         Irritable bowel syndrome: ICD-10: K58.9, Status: Active         Gastroesophageal reflux disease without esophagitis: ICD-10:  K21.9, Status: Active         Pulmonary nodule: ICD-10: R91.1, Status: Active         Palpitations: ICD-10: R00.2, Status: Active         Malignant neoplasm of upper-inner quadrant of left breast in female, estrogen receptor positive : ICD-10: C50.212, Status: Active        Social History:   Social Substance History:  ·  Smoking Status former smoker   ·  Tobacco Use history of abuse   ·  Alcohol Use occasionally   ·  Drug Use denies   ·  Additional History     Primary Support:  (Aleks)  Former Smoker: 18 pack/year  Moderate ETOH use.  Exercises Regularly  (1)           Performance:   ECOG Performance Status: 0 Fully Active         Vitals and Measurements:   Vitals: Temp: 36.8  HR: 79  RR: 18  BP: 137/72  SPO2%:   96   Measurements: HT(cm): 158.5  WT(kg): 79.9  BSA: 1.87   BMI:  31.8   Last 3 Weights & Heights: Date:                           Weight/Scale Type:                     Height:   26-Jun-2023 10:04                79.9  kg                     158.5  cm  27-Jun-2022 09:59                77.6  kg                     158.5  cm      Physical Exam:      Constitutional: Well developed, no distress, alert  and cooperative   Eyes: EOMI, clear sclera   ENMT: mucous membranes moist, no apparent injury,  no lesions seen   Head/Neck: Neck supple   Respiratory/Thorax: Patent airways, CTAB, normal  breath sounds with good chest expansion, thorax symmetric   Cardiovascular: Regular, rate and rhythm, no murmurs,  normal S 1and S 2   Gastrointestinal: Nondistended, soft, non-tender,  no rebound tenderness or guarding, no masses palpable, no organomegaly, +BS   Musculoskeletal: No joint swelling, normal strength   Extremities: normal extremities, no cyanosis edema,  contusions or wounds, no clubbing   Neurological: alert and oriented   Breast: Left breast without masses, skin or nipple  changes. Well healed lumpectomy and SLNbx scar.   Right breast without masses, skin or nipple changes.   Lymphatic: No significant lymphadenopathy   Psychological: Appropriate mood and behavior   Skin: Warm and dry, no lesions, no rashes         Lab Results:           Radiology Result:     ·  Results     FINAL REPORT  Interpreted by: MIRIAM WATERS KATHLEEN, MD and MINA AGUILAR RAJENDRA, MD  09/13/22 12:15  Facility: [facility]     MRN: [id number]  Patient Name: MARIE CHURCHILL     STUDY:  DIGITAL DIAG MAMM BILAT WITH LINDA;  9/13/2022 10:53 am     ACCESSION NUMBER(S):  [id number]     ORDERING CLINICIAN:  ADI BLAKE     INDICATION:  Annual. History of left breast cancer status post lumpectomy. Family  history of breast cancer (maternal grandmother).     COMPARISON:  09/07/2021, 09/03/2020, 10/03/2019.     FINDINGS:  2D and tomosynthesis images were reviewed at 1 mm slice thickness.     There are areas of scattered fibroglandular tissue.  Stable  postoperative scarring is seen in the left breast. No  suspicious  masses or calcifications are identified.     IMPRESSION:  Stable posttreatment changes of the left breast. No mammographic  evidence of malignancy.     BI-RADS CATEGORY:     Category: 2 - Benign.  Recommendation: 1 Year Screening.     For any future breast imaging appointments, please call [phone]     Patient letter sent SNORM     I personally reviewed the images/study and I agree with the findings  as stated by radiology resident Tate Alfredo MD.     Transcribed By: Interface, User  Electronically Signed By: MIRIAM WATERS KATHLEEN   09/13/22 12:15     FINAL REPORT  Interpreted by: ERWIN LOU MD  10/20/22 11:18  Facility: [facility]     Name: MARIE CHURCHILL    Patient ID: [id number] YOB: 1950 Height: 63.0 in.      Gender:     Female   Exam Date:  10/20/2022 Weight: 167.0 lbs.    Indications: long term current use of aromatase inhibitor, Malignant   neoplasm of upper inner quardrant of left breast in female estrogen   receptor positive, postmenopause    Fractures:                                                                                                                                                                      Treatments:  Calcium, Vitamin D                                                                                                                                                    LEFT FEMUR - TOTAL   The bone mineral density : 0.915 g/cm2   T-score : -0.7    % of young normal mean :91 %   Z-score : 1.0    % of age matched mean : 115 %    % change vs. Previous :       % change vs. Baseline :      *Indicates significant change based on 95% confidence interval.      LEFT FEMUR - NECK   The bone mineral density : 0.884 g/cm2   T-score : -1.1    % of young normal mean: 85 %   Z-score : 0.8    % of age matched mean : 114 %    % change vs. Previous :       % change vs. Baseline :      *Indicates significant change based on 95% confidence  interval.      SPINE L1-L4 (L3)   The bone mineral density is 1.054 g/cm2   T-score : -1.1   % of young normal mean is 89 %   Z-score : 0.7    % of age matched mean is 108 %    % change vs. Previous :       % change vs. Baseline :      *Indicates significant change based on 95% confidence interval.       World Health Organization (WHO) criteria for post-menopausal,    Women:     Normal:       T-score at or above -1 SD          Osteopenia:   T-score between -1 and -2.5 SD     Osteoporosis: T-score at or below -2.5 SD           10-Year Fracture Risk:   Major Osteoporotic Fracture 9.7 %    Hip Fracture                1.5 %    Reported Risk Factors       None        Interpretation:   According to World Health Organization criteria, classification low   bone mass (osteopenia).       Followup recommended on October 2024 or sooner as clinically   warranted.     Transcribed By: Interface, User  Electronically Signed By: ERWIN LOU   10/20/22 11:18           Pathology Results:     ·  Results     I have reviewed this pathology result:   Surgical Pathology [Sep 12 2019 3:49PM] ([id number])     Specimens: LEFT BREAST 11:00 8CMFN     Name MARIE CHURCHILL     Accession #: [id number]   Pathologist: ARGENIS MONIQUE MD   Date of Procedure: 9/9/2019   Date Received: 9/9/2019   Date Reported 9/11/2019   Submitting  Physician: CRISTY LAU MD   Location: [facility]   Copy To/Referring/Attending:   KASANDRA SAMAYOA MD Other External #       FINAL DIAGNOSIS   A. LEFT BREAST, 11:00, 8 CM FROM NIPPLE, ULTRASOUND GUIDED CORE NEEDLE BIOPSY:   --  INVASIVE DUCTAL CARCINOMA, GRADE 1-2, SEE NOTE.     Note: In this limited sample, the invasive carcinoma measures up to 0.5 cm in   greatest dimension. ER, CO and HER2 will be reported in an addendum.     : Dr. Devonte Post       The gross and/or microscopic findings were reviewed in conjunction with   pathology resident, Piedad Mackay MD.                          Electronically Signed Out By ARGENIS MONIQUE MD/LEX   By the signature on this report, the individual or group listed as making the   Final Interpretation/Diagnosis certifies that they have reviewed this case.       Addendum/Procedures:   Special Oncology Report Date  Ordered: 2019 Status: Signed Out   Date Complete: 2019   Date Reported: 2019     Addendum Diagnosis   Patient Age: 69   Surgical/Block Number: [id number] A1   Specimen Site: Left breast, 11:00, 8 cm from nipple    Specimen Type: Core Needle Biopsy   Time of Specimen Removal: 10:56   Time Placed in 10% NBF : 10:57   Duration of Fixation : 6-72 hours     INTERPRETATION:     ESTROGEN RECEPTOR (CLONE SP1): POSITIVE   Percentage with Nuclear  Stainin - 80%   Intensity of Staining: Moderate to strong     PROGESTERONE RECEPTOR (CLONE 1E2): NEGATIVE   Percentage with Nuclear Staining: <1%   Intensity of Staining: Weak     HER2 (CLONE 4B5): NEGATIVE (1+)      COMMENT:    Internal positive staining controls were identified in this specimen.     RANGES FOR INTERPRETATION:   For ER/LA: Ranges for interpretation: Invasive   carcinoma cells exhibiting greater than or equal to 1%   nuclear  staining are considered POSITIVE. Invasive   carcinoma cells exhibiting less than 1% nuclear   staining are considered NEGATIVE.   (Reference: J Clin Oncol 2010; 29:5068-2899) The   stated steroid receptor activity was derived from rabbit    monoclonal antibody staining on formalin fixed, paraffin   embedded specimens, unless otherwise noted.   The method employed was a standard peroxidase   labeled polymer detection system. Each assay is   performed using appropriate positive  and negative   internal controls.     For HER2: Ranges for interpretation: POSITIVE (3+):   greater than or equal to 10% tumor cells with intense   and uniform staining; EQUIVOCAL (2+): weak to   moderate complete immunoreactivity  in >10% of tumor   cells or  "circumferential intense staining in less than   or equal to 10% of cells; and NEGATIVE (1+): Faint   weak immunoreactivity in >10% of tumor cells, but only   a portion of the membrane is positive; NEGATIVE (0):    No immunoreactivity or immunoreactivity in less than   or equal to 10% of tumor cells.   All tests are performed using a [employer]   HER-2/gaby (4B5) rabbit monoclonal primary antibody on   formalin fixed, paraffin embedded tissue,  unless   otherwise noted. Only invasive carcinoma is evaluated   using the ASCO/CAP scoring system   (Arch PatholLab Med 2014; 138 (2):241-56) unless   otherwise specified. External cell culture and tissue   controls stain appropriately.          Electronically Signed Out By ANUSHA DOMINGO MD/JESSICA   By the signature on this report, the individual or group listed as making the   Final Interpretation/Diagnosis certifies that they have reviewed this case.       Clinical History:    Ultrasound guided vacuum assisted core biopsy of left breast mass 11:00 8cmfn     Specimens Submitted As:   A: LEFT BREAST 11:00 8CMFN     Gross Description:   Received in formalin, labeled with the patient’s name and hospital  number and   \"left breast 11:00 8 cm from nipple \", are multiple irregular/cylindrical   segments of yellow-white fatty soft tissue aggregating to 1.5 x 0.4 x 0.3 cm.   The specimen is submitted in toto in one cassette.   DPG      NOTE: Ischemia time: 10:56 AM.   This specimen was placed into formalin at: 10:57 AM.     dpg/9/9/2019             =========================== Next Report ===========================      Surgical Pathology [Oct 8 2019 9:51AM] ([id number])    Specimens: SENTINEL LYMPH NODE BIOPSY - LEFT /LEFT BREAST  TISSUE /LEFT BREAST TISSUE SUPERIOR /LEFT BREAST TISSUE LATERAL /LEFT BREAST TISSUE MEDIAL /LEFT BREAST TISSUE INFERIOR /LEFT BREAST TISSUE POSTERIOR /LEFT BREAST TISSUE ANTERIOR /LEFT BREASTSKIN /Received fresh for intraoperative " consultation, labeled  with the patient's/Received fresh, on an accugrid labeled with the patient’s name and hospital/Received in formalin, labeled with the patient's name and hospital number/Received in formalin, labeled with the patient's name and hospital number/Received  in formalin, labeled with the patient's name and hospital number/Received in formalin, labeled with the patient's name and hospital number/Received in formalin, labeled with the patient'sname and hospital number/Received in formalin, labeled with the  patient's name and hospital number/Received in formalin, labeled with the patient's name and hospital number     Name MARIE CHURCHILL     Accession #: [id number]   Pathologist: ANUSHA DOMINGO MD   Date of Procedure: 10/3/2019    Date Received: 10/3/2019   Date Reported 10/8/2019   SubmittingPhysician: CRISTY LAU MD   Location: [facility] Other External #       FINAL DIAGNOSIS   A. SENTINEL LYMPH NODE, LEFT, EXCISION:   -- TWO LYMPH NODES, NEGATIVE FOR  CARCINOMA (0/2).     B. LEFT BREAST TISSUE, MAG SEED LOCALIZED PARTIAL MASTECTOMY:   -- INVASIVE DUCTAL CARCINOMA, SEE SYNOPTIC REPORT     C. LEFT BREAST TISSUE SUPERIOR MARGIN , EXCISION:   -- FIBROADIPOSE TISSUE, NEGATIVE FOR CARCINOMA.      D. LEFT BREASTTISSUE, LATERAL MARGIN, EXCISION:   -- BENIGN BREAST TISSUE.     E. LEFT BREAST TISSUE MEDIAL MARGIN, EXCISION:   -- FIBROADIPOSE TISSUE, NEGATIVE FOR CARCINOMA.     F. LEFT BREAST TISSUE, INFERIOR MARGIN, EXCISION:    -- BENIGN BREAST TISSUE.     G. LEFT BREAST TISSUE, POSTERIOR MARGIN, EXCISION:   -- BENIGN BREAST TISSUE.     H. LEFT BREAST TISSUE, ANTERIOR MARGIN, EXCISION:   -- FIBROADIPOSE TISSUE, NEGATIVE FOR CARCINOMA.     I. LEFT  BREAST SKIN, EXCISION:   -- SKIN WITH UNDERLYING SOFT TISSUE, NEGATIVE FOR CARCINOMA     : Dr Blaire Caban     The gross and/or microscopic findings were reviewed in conjunction with   pathology resident, Behiye Goksel, M.D.              CASE SUMMARY REPORT   Invasive Breast Cancer   Staging According to American Joint Committee on   Cancer Staging Manual 8th Edition     Macroscopic:   Specimen: Partial Breast   Procedure:  Excision with wire-guided or Mag seed localization   Lymph Node Sampling: [place] lymph node(s)   Specimen Integrity: Multiple designated specimens   Specimen Laterality: Left   Specimen Size: Greatest dimension 4.0 cm, Additional dimensions  3.0 x 2.2   cm   Tumor Site: Not specified   Tumor Size: Greatest dimension 0.5 cm     Note : The size of tumor, as measured by gross examination must be   verified by microscopic examination. If there is a discrepancy between the    gross and microscopic tumor measurement, the microscopic measurement of the   invasive component takes precedence and should be used for tumor staging. In   some cases, it may be helpful to use information about tumor sizefrom imaging   studies.      Macroscopic and Microscopic Extent of Tumor:   Tumor focality: Single focus of invasive carcinoma   Skin: Skin is not present   Nipple: Nipple is not present   Skeletal muscle: No skeletal muscle present   Histologic Type: Invasive  ductal carcinoma   Histologic grade: Durand Histologic Score:   Tubule Formation: Moderate 10% to 75% (score = 2)   Nuclear pleomorphism: Marked variation in size, nucleoli, chromatin   clumping (score = 3)   Mitotic count: 0 to  5 mitoses per 10 HPF (score = 1)   Total Gregory Score: Grade II: 6 - 7 points   Lymphatic Vessel invasion: Absent   Ductal Carcinoma In Situ: Absent   Extensive Intraductal Component (EIC): Absent   Lobular Carcinoma in Situ: Absent    Microcalcifications: Not identified     Margins:     Invasive Carcinoma: Negative   All final margins greater than or equal to 2 mm   Ductal Carcinoma In situ: Not Applicable     Extent of Invasion:   TNM descriptors:  None   Primary Tumor (pT): pT1a: Tumor more than 0.1 cm but not more than 0.5 cm   in  "greatest dimension   Lymph nodes: Number Examined: 2   Number with macrometastases (>0.2 cm): 0   Number with micrometastases (>0.2 mm) to 0.2 cm  and/or >200 cells): 0   Number with isolated tumor cells (<= 0.2 mm and <= 200 cells): 0   Regional Lymph Nodes (pN): pN0: No regional lymph node metastasis   histologically   Distant metastasis (pM): pM: Unknown   Estrogen Receptor:  See prior report: [id number] A1   Progesterone Receptor: See prior report: [id number] A1   HER2: See prior report: [id number] A1   Biopsy site change: Yes   Additional pathologic findings: Usual ductal hyperplasia   CMC Breast: Invasive  Additional Testing: Tumor Block: B8   Normal Block: B1                 Electronically Signed Out By ANUSHA DOMINGO MD/[facility]   By the signatureon this report, the individual or group listed as making the   Final  Interpretation/Diagnosis certifies that they have reviewed this case.     Intraoperative Consultation:   A: SENTINEL LYMPH NODE BIOPSY - LEFT     Frozen Section 1:   Date Ordered: 10/3/2019 08:42 Date Received: 10/3/2019 08:42 Date    Called: 10/3/2019 08:50     Intraoperative Diagnosis:   Two lymph nodes, negative for carcinoma.   Intraoperative Consult Pathologist(s):   ANUSHA DOMINGO MD (P)       rl/10/3/2019       Clinical History:    Left breast cancer; See orientation       Specimens Submitted As:   A: SENTINEL LYMPH NODE BIOPSY - LEFT   B: LEFT BREAST TISSUE   C: LEFT BREAST TISSUE SUPERIOR   D: LEFT BREAST TISSUE LATERAL   E: LEFT BREAST TISSUE MEDIAL    F: LEFTBREAST TISSUE INFERIOR   G: LEFT BREAST TISSUE POSTERIOR   H: LEFT BREAST TISSUE ANTERIOR   I: LEFT BREAST SKIN     Gross Description:   A: Received fresh for intraoperative consultation, labeled with the patient's    name and hospital number \"sentinellymph node biopsy - left\", is a single   fragment of fibroadipose tissue measuring 3.0 x 2.5 x 0.6 cm. Upon palpation   two lymph nodes are identified. The larger lymph node " "measures 1.0 cm in   greatest dimension  and the smaller lymph node measures 0.5cm in greatest   dimension. The larger lymph node is inked and bisected. The cut surface does   not appear to be grossly involved by carcinoma. The lymph nodes are submitted   for frozen section in one  cassette. Entire specimen is submitted in 2   cassettes.   ACG     B:Received fresh, on an accugrid labeled with the patient’s name and hospital   number and \"left breast tissue\", is an irregular segment of yellow lobulated    fibrofatty tissue, 4.0 (medial lateral) by 3.0 (anterior-posterior) by 2.2   (superior inferior)cm. The specimen is oriented with a short superior and a   long lateral stitch. A localizing needle is not identified. The specimen is   inked  unconventionally in the following manner: Red superior, orange lateral,   yellow medial, greenanterior, blue inferior, black posterior. The specimen is   serially sectioned from medial to lateral. The cut surface reveals a poorly   defined  white firm lesion, 0.5 x 0.5 x 0.5 cm. The lesion is 0.4 cm from the   anterior margin, 0.4 cm from the posterior margin, 0.2 cm from the superior   margin, 0.6 cm from the inferior margin, 1.5 cm from the lateral margin, and   1.5 cm from  the medial margin. A hemorrhagic biopsy cavity spiral clip is   identified measuring 0.6 x 0.2 x 0.2 cm and is located0.1 cm from the superior   margin. The remainder of the breast parenchyma is yellow and fatty. No other   lesions are identified.  A photograph has been taken. The specimen is entirely   submitted in 10 cassettes.   IAD     NOTE: Ischemia time: 08:55.   This specimen was placed into formalin at: 09:25.       C: Received in formalin, labeled with the patient's  name and hospital number   and \"C, red superior\", is an irregular segment of yellow lobulated fibrofatty   tissue, 2.0 x 1.2 x 0.4 cm. The specimen is oriented with red ink on the new   margin. The specimen is serially sectioned to " "reveal  a homogeneous fatty cut   surface. No lesion is identified. The specimen is entirely submitted in one   cassette.   DJO     D: Received in formalin, labeled with the patient's name and hospital number   and \"D, orange lateral\",  is an irregular segment of yellow lobulated fibrofatty   tissue, 2.2 x 1.5 x 0 cm. The specimen is oriented with orange ink the new   margin. The specimen is serially sectioned to reveal a homogeneous fatty cut   surface. No lesion is identified.  The specimen is entirely submitted in one   cassette.   DJO     E: Received in formalin, labeled with the patient's name and hospital number   and \"E, yellow medial\", is an irregular segment of yellow lobulated fibrofatty   tissue,  1.9 x 1.2 x 0.6 cm. The specimen is oriented with yellow ink on the   new margin. The specimen is serially sectioned to reveal a homogeneous fatty   cut surface. No lesion is identified. The specimen is entirely submitted in   one cassette.    DJO     F: Received in formalin, labeled with the patient's name and hospital number   and \"F, blue inferior\", is an irregular segment of yellow lobulated fibrofatty   tissue, 3.2 x 1.5 x 0.5 cm. The specimen is oriented with blue ink on  the new   margin. The specimen is serially sectioned to reveal a homogeneous fatty cut   surface. No lesion is identified. The specimen is entirely submitted in 2   cassettes.   DJO     G: Received in formalin, labeled with the  patient's name and hospital number   and \"G, black posterior\", is an irregular segment of yellow lobulated   fibrofatty tissue, 1.8 x 1.2 x 0.4 cm. The specimen is oriented with black ink   on the new margin. The specimen is serially sectioned  to reveal a homogeneous   fatty cut surface. No lesion is identified. The specimen is entirely   submitted in one cassette.   DJO     H: Received in formalin, labeled with the patient's name and hospital number   and \"H, green anterior\",  is an irregular segmentof yellow " "lobulated fibrofatty   tissue, 2.0 x 1.2 x 0.5 cm. The specimen is oriented with green ink on the new   margin. The specimen is serially sectioned to reveal a homogeneous fatty cut   surface. No lesion is identified.  The specimen is entirely submitted in one   cassette.   DJO     I: Received in formalin, labeled with the patient's name and hospital number   and \"I, left breast skin\", are multiple segments of scarred skin aggregating to   2.5  x 2.0 x 0.7 cm. A scar is not apparent. The specimen is submitted entirely   in one cassette.   DJO     Summary of Cassettes:   Specimen Label Site   B 1 slice one, most medial   2 slice 2, anterior, inferior, posterior, medial    3 slice 3, anterior, posterior, inferior, medial   4 slice 4, anterior, posterior, inferior   5 slice 5, anterior, posterior, lesion, inferior, superior   6 slice 6, anterior, posterior, lateral, superior, lesion   7 slice 7,anterior,  posterior, lateral, superior, biopsy cavity,   lesion, clip   8 slice 8, superior, lateral, biopsy cavity   9 slice 9, lateral, superior   10 slice 10, most lateral       rl/10/3/2019                  Assessment and Plan:         1. Left sided breast cancer Dx in 9/2019:   Patient with early stage left sided breast cancer s/p full excision and avoiding local radiation.    On arimidex since 10/2019, likely only need 5 years of therapy given low risk features however would consider BCI testing to decide duration at that time.  The patient to receive Arimidex through the end of 2024 and then discontinue.  Mammogram in September 2024.   No evidence of breast cancer recurrence per patient history, physical examination and imaging findings.   Last mammogram 9/2023 reviewed, continue yearly   Last DEXA 10/2022 reviewed with osteopenia.              3. ETOH intake   1-2 drinks a day   Discussed limiting to 3-4 drinks maximum         RTC 1 year with SHARON.   "

## 2024-06-27 NOTE — PROGRESS NOTES
"Patient seen by Dr. Tran today in clinic. Reinforcement education provided regarding next steps with plan of care.      PER DR. TRAN'S FUV NOTE TODAY: \"1. Left sided breast cancer Dx in 9/2019:   Patient with early stage left sided breast cancer s/p full excision and avoiding local radiation.    On arimidex since 10/2019, likely only need 5 years of therapy given low risk features however would consider BCI testing to decide duration at that time.  The patient to receive Arimidex through the end of 2024 and then discontinue.  Mammogram in September 2024.   No evidence of breast cancer recurrence per patient history, physical examination and imaging findings.   Last mammogram 9/2023 reviewed, continue yearly   Last DEXA 10/2022 reviewed with osteopenia.\"    Pt continues to also follow LISA Degroot with surgeon Dr. Valdez at [facility]. Yearly mammogram sched September/2024.  Office orientation provided along with written office contact information for any future needs or questions.  Hgb from 5/22/24 11.9.  written information re: foods rich in iron PI provided. FUV in 1 year.  Patient verbalizes understanding of plan of care via teachback method.             "

## 2024-07-03 ENCOUNTER — TREATMENT (OUTPATIENT)
Dept: PHYSICAL THERAPY | Facility: CLINIC | Age: 74
End: 2024-07-03
Payer: MEDICARE

## 2024-07-03 DIAGNOSIS — G89.29 CHRONIC BILATERAL LOW BACK PAIN WITHOUT SCIATICA: ICD-10-CM

## 2024-07-03 DIAGNOSIS — M54.50 CHRONIC BILATERAL LOW BACK PAIN WITHOUT SCIATICA: ICD-10-CM

## 2024-07-03 PROCEDURE — 97110 THERAPEUTIC EXERCISES: CPT | Mod: GP | Performed by: PHYSICAL THERAPIST

## 2024-07-03 ASSESSMENT — PAIN SCALES - GENERAL: PAINLEVEL_OUTOF10: 0 - NO PAIN

## 2024-07-03 ASSESSMENT — PAIN - FUNCTIONAL ASSESSMENT: PAIN_FUNCTIONAL_ASSESSMENT: 0-10

## 2024-07-03 NOTE — PROGRESS NOTES
"Physical Therapy    Physical Therapy Treatment    Patient Name: Obdulia Washington  MRN: [id number]  Today's Date: 7/3/2024  Time Calculation  Start Time: 0747  Stop Time: 0832  Time Calculation (min): 45 min     PT Therapeutic Procedures Time Entry  Therapeutic Exercise Time Entry: 45                   Current Problem  1. Chronic bilateral low back pain without sciatica  Follow Up In Physical Therapy        Problem List Items Addressed This Visit             ICD-10-CM    Chronic bilateral low back pain M54.50, G89.29       SUBJECTIVE  Subjective   General  Reason for Referral: back pain  Referred By: Dr. Johnny Arora  Past Medical History Relevant to Rehab: hx breast cancer, HTN  Precautions  Precautions  Precautions Comment: none       Pain  Pain Assessment: 0-10  0-10 (Numeric) Pain Score: 0 - No pain  Pain Location: Back    Patient reports that overall she is feeling better. States she was more active about her home yesterday so she states that she feels she may \"have over done it\" and had increased back pain and soreness last night but denies pain at start of session this date. Reports back pain is occurring less frequently with slightly less intensity than before.   Patient reports compliance with HEP.     VISIT NUMBER: 3  EVAL 6/25/24   Re-Check due 7/25/24   Auth not req, BMN, medicare     Treatments:  TherEx:  Stepper 5 min   IB 2x1 min   HSS 2x30 seconds   Shuttle 6 bands, 3x10   SL Shuttle 4 bands, 3x10 (B)   Seated Ball Roll Out 3 min ea Fwd, Side-Side   Side Stepping Red, 2 laps   Monster walks Red, 2 laps   Stand Hip ABD/Ext/Flx Red, 1 min ea (B) (p, added flx)   RB 2x1 min NEW        Objective   No objective measures assessed this visit     Assessment:     Able to progress interventions this date to improve strength and balance to improve stability and reduce pain with functional mobility and tasks. Patient challenged as expected to address deficits.     This session exercises were progressed (p) or added " (NEW) as documented in treatment section.  Pt required minimal to moderate cues and demonstration to complete exercises with proper form and responded without adverse effects.        Plan:     Continue to progress treatment to address strength, motion, jt mobility, soft tissue mobility, and flexibility deficits impacting patient's return to PLOF unrestricted and symptom free.     Goals:  Active       PT Problem       AURELIO 15% or less       Start:  06/25/24    Expected End:  08/24/24            Pt will demo improved MMT to at least 4/5 on 0-5 point scale in BLE for improved strength and stability, and improved ease with transfers, lifting/carrying, and proper mechanics with ADL's & IADL's.         Start:  06/25/24    Expected End:  08/24/24            Patient will be able to lift 10 lbs from floor to waist without pain and good mechanics to allow for return to PLOF and ability to complete ADLs and HHCs without restriction.         Start:  06/25/24    Expected End:  08/24/24            Patient will demonstrate improved standing tolerance to 60+ mins, independently, with good mechanics, and without assistance to allow for improved safety and function with ADLs, HHCs, and all functional mobility.          Start:  06/25/24    Expected End:  08/24/24            Patient will demonstrate improved sitting tolerance to 60+ mins, independently, with maintenance of proper posture, and without pain during or with return to standing to improve ability to complete ADLs and HHCs at prior level        Start:  06/25/24    Expected End:  08/24/24            Patient will be able to tolerate continuous ambulation for at least 60+ minutes or greater without pain or limitation from prior level of function to improve safety and function with community mobility.         Start:  06/25/24    Expected End:  08/24/24

## 2024-07-16 ENCOUNTER — TREATMENT (OUTPATIENT)
Dept: PHYSICAL THERAPY | Facility: CLINIC | Age: 74
End: 2024-07-16
Payer: MEDICARE

## 2024-07-16 DIAGNOSIS — G89.29 CHRONIC BILATERAL LOW BACK PAIN WITHOUT SCIATICA: ICD-10-CM

## 2024-07-16 DIAGNOSIS — M54.50 CHRONIC BILATERAL LOW BACK PAIN WITHOUT SCIATICA: ICD-10-CM

## 2024-07-16 PROCEDURE — 97110 THERAPEUTIC EXERCISES: CPT | Mod: GP | Performed by: PHYSICAL THERAPIST

## 2024-07-16 ASSESSMENT — PAIN - FUNCTIONAL ASSESSMENT: PAIN_FUNCTIONAL_ASSESSMENT: 0-10

## 2024-07-16 ASSESSMENT — PAIN SCALES - GENERAL: PAINLEVEL_OUTOF10: 0 - NO PAIN

## 2024-07-16 NOTE — PROGRESS NOTES
Physical Therapy    Physical Therapy Treatment    Patient Name: Obdulia Washington  MRN: [id number]  Today's Date: 7/16/2024  Time Calculation  Start Time: 1245  Stop Time: 1325  Time Calculation (min): 40 min     PT Therapeutic Procedures Time Entry  Therapeutic Exercise Time Entry: 40                   Current Problem  1. Chronic bilateral low back pain without sciatica  Follow Up In Physical Therapy        Problem List Items Addressed This Visit             ICD-10-CM    Chronic bilateral low back pain M54.50, G89.29       SUBJECTIVE  Subjective   General  Reason for Referral: back pain  Referred By: Dr. Johnny Arora  Past Medical History Relevant to Rehab: hx breast cancer, HTN  Precautions  Precautions  Precautions Comment: none       Pain  Pain Assessment: 0-10  0-10 (Numeric) Pain Score: 0 - No pain  Pain Location: Back    Patient reports she continues to have pain with increased activity.   Patient reports partial compliance with HEP.     VISIT NUMBER: 4  EVAL 6/25/24   Re-Check due 7/25/24   Auth not req, BMN, medicare     Treatments:  TherEx:  Stepper 5 min   IB 2x1 min   HSS 2x30 seconds   Shuttle 6 bands, 3x10   SL Shuttle 4 bands, 3x10 (B)   Seated Ball Roll Out 3 min ea Fwd, Side-Side   Side Stepping Red, 2 laps   Monster walks Red, 2 laps   Stand Hip ABD/Ext/Flx Red, 2x10 ea (B)   RB 2x1 min   Slow March on AE 1 min        Objective   No objective measures assessed this visit       Assessment:     Patient continues to be challenged by interventions d/t weakness throughout lumbopelvic region. Patient appropriately challenged to address strength deficits contributing to ongoing back pain impacting her ability to function at prior level.     This session exercises were progressed (p) or added (NEW) as documented in treatment section.  Pt required minimal to moderate cues and demonstration to complete exercises with proper form and responded without adverse effects.        Plan:     Continue to progress  treatment to address strength, motion, jt mobility, soft tissue mobility, and flexibility deficits impacting patient's return to PLOF unrestricted and symptom free.     Goals:  Active       PT Problem       AURELIO 15% or less       Start:  06/25/24    Expected End:  08/24/24            Pt will demo improved MMT to at least 4/5 on 0-5 point scale in BLE for improved strength and stability, and improved ease with transfers, lifting/carrying, and proper mechanics with ADL's & IADL's.         Start:  06/25/24    Expected End:  08/24/24            Patient will be able to lift 10 lbs from floor to waist without pain and good mechanics to allow for return to PLOF and ability to complete ADLs and HHCs without restriction.         Start:  06/25/24    Expected End:  08/24/24            Patient will demonstrate improved standing tolerance to 60+ mins, independently, with good mechanics, and without assistance to allow for improved safety and function with ADLs, HHCs, and all functional mobility.          Start:  06/25/24    Expected End:  08/24/24            Patient will demonstrate improved sitting tolerance to 60+ mins, independently, with maintenance of proper posture, and without pain during or with return to standing to improve ability to complete ADLs and HHCs at prior level        Start:  06/25/24    Expected End:  08/24/24            Patient will be able to tolerate continuous ambulation for at least 60+ minutes or greater without pain or limitation from prior level of function to improve safety and function with community mobility.         Start:  06/25/24    Expected End:  08/24/24

## 2024-07-18 ENCOUNTER — TREATMENT (OUTPATIENT)
Dept: PHYSICAL THERAPY | Facility: CLINIC | Age: 74
End: 2024-07-18
Payer: MEDICARE

## 2024-07-18 DIAGNOSIS — G89.29 CHRONIC BILATERAL LOW BACK PAIN WITHOUT SCIATICA: ICD-10-CM

## 2024-07-18 DIAGNOSIS — M54.50 CHRONIC BILATERAL LOW BACK PAIN WITHOUT SCIATICA: ICD-10-CM

## 2024-07-18 PROCEDURE — 97110 THERAPEUTIC EXERCISES: CPT | Mod: GP | Performed by: PHYSICAL THERAPIST

## 2024-07-18 ASSESSMENT — PAIN SCALES - GENERAL: PAINLEVEL_OUTOF10: 0 - NO PAIN

## 2024-07-18 ASSESSMENT — PAIN - FUNCTIONAL ASSESSMENT: PAIN_FUNCTIONAL_ASSESSMENT: 0-10

## 2024-07-18 NOTE — PROGRESS NOTES
Physical Therapy    Physical Therapy Treatment    Patient Name: Obdulia Washington  MRN: [id number]  Today's Date: 7/18/2024  Time Calculation  Start Time: 1247  Stop Time: 1328  Time Calculation (min): 41 min     PT Therapeutic Procedures Time Entry  Therapeutic Exercise Time Entry: 41                   Current Problem  1. Chronic bilateral low back pain without sciatica  Follow Up In Physical Therapy        Problem List Items Addressed This Visit             ICD-10-CM    Chronic bilateral low back pain M54.50, G89.29       SUBJECTIVE  Subjective   General  Reason for Referral: back pain  Referred By: Dr. Johnny Aroar  Past Medical History Relevant to Rehab: hx breast cancer, HTN  Precautions  Precautions  Precautions Comment: none       Pain  Pain Assessment: 0-10  0-10 (Numeric) Pain Score: 0 - No pain  Pain Location: Back    Patient reports that for the last 3 days she feels her ankles have been giving out on her which she attributes to trying to balance on rocker board. Denies pain in her low back but states she hasn't does any triggering activities.   Patient reports compliance with HEP.     VISIT NUMBER: 5  EVAL 6/25/24   Re-Check due 7/25/24   Auth not req, BMN, medicare     Treatments:  TherEx:  Stepper 5 min   IB 2x1 min   HSS 2x30 seconds   Shuttle 6 bands, 3x10   SL Shuttle 4 bands, 3x10 (B)   Seated Ball Roll Out 3 min ea Fwd, Side-Side   Side Stepping Green, 2 laps (p, resistance)   Monster walks Green, 2 laps (p, resistance)   Stand Hip ABD/Ext/Flx Green, 2x10 ea (B) (p, resistance)   RB 2x1 min HOLD per patient's request.   Slow March on AE 1 min   Anti-Rotation Red, 5/5, 2 min (B) NEW        Objective   No objective measures assessed this visit       Assessment:     Modified session per patient's request this date d/t c/o of ankle instability. Educated patient that addressing balance deficits should not produce ankle instability while walking but honored patient's request to hold balance board.     This  session exercises were progressed (p) or added (NEW) as documented in treatment section.  Pt required minimal to moderate cues and demonstration to complete exercises with proper form and responded without adverse effects.        Plan:     Continue to progress treatment to address strength, motion, jt mobility, soft tissue mobility, and flexibility deficits impacting patient's return to PLOF unrestricted and symptom free.     Goals:  Active       PT Problem       AURELIO 15% or less       Start:  06/25/24    Expected End:  08/24/24            Pt will demo improved MMT to at least 4/5 on 0-5 point scale in BLE for improved strength and stability, and improved ease with transfers, lifting/carrying, and proper mechanics with ADL's & IADL's.         Start:  06/25/24    Expected End:  08/24/24            Patient will be able to lift 10 lbs from floor to waist without pain and good mechanics to allow for return to PLOF and ability to complete ADLs and HHCs without restriction.         Start:  06/25/24    Expected End:  08/24/24            Patient will demonstrate improved standing tolerance to 60+ mins, independently, with good mechanics, and without assistance to allow for improved safety and function with ADLs, HHCs, and all functional mobility.          Start:  06/25/24    Expected End:  08/24/24            Patient will demonstrate improved sitting tolerance to 60+ mins, independently, with maintenance of proper posture, and without pain during or with return to standing to improve ability to complete ADLs and HHCs at prior level        Start:  06/25/24    Expected End:  08/24/24            Patient will be able to tolerate continuous ambulation for at least 60+ minutes or greater without pain or limitation from prior level of function to improve safety and function with community mobility.         Start:  06/25/24    Expected End:  08/24/24

## 2024-07-24 ENCOUNTER — TREATMENT (OUTPATIENT)
Dept: PHYSICAL THERAPY | Facility: CLINIC | Age: 74
End: 2024-07-24
Payer: MEDICARE

## 2024-07-24 DIAGNOSIS — M54.50 CHRONIC BILATERAL LOW BACK PAIN WITHOUT SCIATICA: ICD-10-CM

## 2024-07-24 DIAGNOSIS — G89.29 CHRONIC BILATERAL LOW BACK PAIN WITHOUT SCIATICA: ICD-10-CM

## 2024-07-24 PROCEDURE — 97110 THERAPEUTIC EXERCISES: CPT | Mod: GP | Performed by: PHYSICAL THERAPIST

## 2024-07-24 ASSESSMENT — PAIN - FUNCTIONAL ASSESSMENT: PAIN_FUNCTIONAL_ASSESSMENT: 0-10

## 2024-07-24 ASSESSMENT — PAIN SCALES - GENERAL: PAINLEVEL_OUTOF10: 0 - NO PAIN

## 2024-07-24 NOTE — PROGRESS NOTES
Physical Therapy    Physical Therapy Treatment    Patient Name: Obdulia Washington  MRN: [id number]  Today's Date: 7/24/2024  Time Calculation  Start Time: 1001  Stop Time: 1044  Time Calculation (min): 43 min     PT Therapeutic Procedures Time Entry  Therapeutic Exercise Time Entry: 43                   Current Problem  1. Chronic bilateral low back pain without sciatica  Follow Up In Physical Therapy        Problem List Items Addressed This Visit             ICD-10-CM    Chronic bilateral low back pain M54.50, G89.29       SUBJECTIVE  Subjective   General  Reason for Referral: back pain  Referred By: Dr. Johnny Arora  Past Medical History Relevant to Rehab: hx breast cancer, HTN  Precautions  Precautions  Precautions Comment: none       Pain  Pain Assessment: 0-10  0-10 (Numeric) Pain Score: 0 - No pain  Pain Location: Back    Patient reports that intensity of pain is lessening. Reports she continues to have pain with activity. Reports HEP is going well.   Patient reports compliance with HEP.     VISIT NUMBER: 6  EVAL 6/25/24   Re-Check 7/24/24  Auth not req, BMN, medicare     Treatments:  TherEx:  Stepper 5 min   IB 2x1 min   HSS 2x30 seconds   Shuttle 6 bands, 3x10   SL Shuttle 4 bands, 3x10 (B)   Seated Ball Roll Out 3 min ea Fwd, Side-Side   Side Stepping Green, 2 laps  Monster walks Green, 2 laps   Stand Hip ABD/Ext/Flx Green, 2x10 ea (B)   RB 2x1 min   Slow March on AE 1 min   Anti-Rotation Red, 5/5, 2 min (B) HELD time constraint        Objective   Lumbar ROM:  Date:  6/25/24 7/24/24   Flexion 85% 90%   Extension 75% PDM 75%    RIGHT LEFT RIGHT LEFT   Side Bend 100% 100% WNL WNL   Rotation 100% 100% WNL WNL      Lower Extremity Strength:  Date 6/25/24 7/24/24    Myotome RIGHT LEFT RIGHT LEFT   Hip Flexion L1,2 4/5 4/5 4/5 4/5   Hip Extension  --- 3+/5 3+/5 4-/5 4-/5   Hip Abduction  --- 3+/5 3+/5 3+/5 3+/5   Hip Adduction  --- 4/5 3+/5 4/5 4/5   Knee Extension L3 5/5 5/5 5/5 5/5   Knee Flexion S2 5/5 5/5 5/5  5/5       Outcome Measures:  Other Measures  Oswestry Disablity Index (AURELIO): 7/50 (14%)     Assessment:  PT Assessment  PT Assessment Results: Decreased strength, Decreased range of motion, Decreased endurance, Impaired balance, Decreased mobility, Impaired tone, Pain  Rehab Prognosis: Good  Barriers to Discharge: chronicity of condition    Patient is progressing steadily. Continues to have pain limiting her ability to function at prior level but improving steadily. Will continue to benefit from skilled PT intervention to address ongoing deficits impacting her ability to function at prior level.     This session exercises were progressed (p) or added (NEW) as documented in treatment section.  Pt required minimal to moderate cues and demonstration to complete exercises with proper form and responded without adverse effects.        Plan:  OP PT Plan  Treatment/Interventions: Blood flow restriction therapy, Cryotherapy, Dry needling, Education/ Instruction, Electrical stimulation, Gait training, Hot pack, Manual therapy, Mechanical traction, Neuromuscular re-education, Taping techniques, Therapeutic activities, Therapeutic exercises, Ultrasound, Vasopneumatic device  PT Plan: Skilled PT  PT Frequency: 2 times per week  Duration: 4 weeks  Rehab Potential: Good  Plan of Care Agreement: Patient  Continue to progress treatment to address strength, motion, jt mobility, soft tissue mobility, and flexibility deficits impacting patient's return to PLOF unrestricted and symptom free.     Goals:  Active       PT Problem       AURELIO 15% or less (Progressing)       Start:  06/25/24    Expected End:  08/24/24            Pt will demo improved MMT to at least 4/5 on 0-5 point scale in BLE for improved strength and stability, and improved ease with transfers, lifting/carrying, and proper mechanics with ADL's & IADL's.   (Progressing)       Start:  06/25/24    Expected End:  08/24/24            Patient will be able to lift 10 lbs from  floor to waist without pain and good mechanics to allow for return to PLOF and ability to complete ADLs and HHCs without restriction.   (Progressing)       Start:  06/25/24    Expected End:  08/24/24            Patient will demonstrate improved standing tolerance to 60+ mins, independently, with good mechanics, and without assistance to allow for improved safety and function with ADLs, HHCs, and all functional mobility.    (Progressing)       Start:  06/25/24    Expected End:  08/24/24            Patient will demonstrate improved sitting tolerance to 60+ mins, independently, with maintenance of proper posture, and without pain during or with return to standing to improve ability to complete ADLs and HHCs at prior level  (Progressing)       Start:  06/25/24    Expected End:  08/24/24            Patient will be able to tolerate continuous ambulation for at least 60+ minutes or greater without pain or limitation from prior level of function to improve safety and function with community mobility.   (Progressing)       Start:  06/25/24    Expected End:  08/24/24

## 2024-07-26 ENCOUNTER — TREATMENT (OUTPATIENT)
Dept: PHYSICAL THERAPY | Facility: CLINIC | Age: 74
End: 2024-07-26
Payer: MEDICARE

## 2024-07-26 DIAGNOSIS — G89.29 CHRONIC BILATERAL LOW BACK PAIN WITHOUT SCIATICA: ICD-10-CM

## 2024-07-26 DIAGNOSIS — M54.50 CHRONIC BILATERAL LOW BACK PAIN WITHOUT SCIATICA: ICD-10-CM

## 2024-07-26 PROCEDURE — 97110 THERAPEUTIC EXERCISES: CPT | Mod: GP | Performed by: PHYSICAL THERAPIST

## 2024-07-26 ASSESSMENT — PAIN - FUNCTIONAL ASSESSMENT: PAIN_FUNCTIONAL_ASSESSMENT: 0-10

## 2024-07-26 ASSESSMENT — PAIN SCALES - GENERAL: PAINLEVEL_OUTOF10: 0 - NO PAIN

## 2024-07-26 NOTE — PROGRESS NOTES
Physical Therapy    Physical Therapy Treatment    Patient Name: Obdulia Washington  MRN: [id number]  Today's Date: 7/26/2024  Time Calculation  Start Time: 0958  Stop Time: 1041  Time Calculation (min): 43 min     PT Therapeutic Procedures Time Entry  Therapeutic Exercise Time Entry: 43                   Current Problem  1. Chronic bilateral low back pain without sciatica  Follow Up In Physical Therapy        Problem List Items Addressed This Visit             ICD-10-CM    Chronic bilateral low back pain M54.50, G89.29       SUBJECTIVE  Subjective   General  Reason for Referral: back pain  Referred By: Dr. Johnny Arora  Past Medical History Relevant to Rehab: hx breast cancer, HTN  Precautions  Precautions  Precautions Comment: none       Pain  Pain Assessment: 0-10  0-10 (Numeric) Pain Score: 0 - No pain  Pain Location: Back    Patient reports that she was sore yesterday but is feeling better today.   Patient reports compliance with HEP.     VISIT NUMBER: 7  EVAL 6/25/24   Re-Check 7/24/24  Auth not req, BMN, medicare     Treatments:  TherEx:  Stepper 5 min   IB 2x1 min   HSS 2x30 seconds   Shuttle 6 bands, 3x10   SL Shuttle 4 bands, 3x10 (B)   Seated Ball Roll Out 3 min ea Fwd, Side-Side   Side Stepping Green, 2 laps  Monster walks Green, 2 laps   Stand Hip ABD/Ext/Flx Green, 2x10 ea (B)   RB 2x1 min   Slow March on AE 1 min   Anti-Rotation Red, 5/5, 2 min (B)  STS add nv        Objective   No objective measures assessed this visit       Assessment:     Patient tolerates session well. Continues to be challenged with interventions d/t strength and balance deficits but improving steadily. Plan to progress per flow sheet nv.     This session exercises were progressed (p) or added (NEW) as documented in treatment section.  Pt required minimal to moderate cues and demonstration to complete exercises with proper form and responded without adverse effects.        Plan:     Continue to progress treatment to address strength,  motion, jt mobility, soft tissue mobility, and flexibility deficits impacting patient's return to PLOF unrestricted and symptom free.     Goals:  Active       PT Problem       AURELIO 15% or less (Progressing)       Start:  06/25/24    Expected End:  08/24/24            Pt will demo improved MMT to at least 4/5 on 0-5 point scale in BLE for improved strength and stability, and improved ease with transfers, lifting/carrying, and proper mechanics with ADL's & IADL's.   (Progressing)       Start:  06/25/24    Expected End:  08/24/24            Patient will be able to lift 10 lbs from floor to waist without pain and good mechanics to allow for return to PLOF and ability to complete ADLs and HHCs without restriction.   (Progressing)       Start:  06/25/24    Expected End:  08/24/24            Patient will demonstrate improved standing tolerance to 60+ mins, independently, with good mechanics, and without assistance to allow for improved safety and function with ADLs, HHCs, and all functional mobility.    (Progressing)       Start:  06/25/24    Expected End:  08/24/24            Patient will demonstrate improved sitting tolerance to 60+ mins, independently, with maintenance of proper posture, and without pain during or with return to standing to improve ability to complete ADLs and HHCs at prior level  (Progressing)       Start:  06/25/24    Expected End:  08/24/24            Patient will be able to tolerate continuous ambulation for at least 60+ minutes or greater without pain or limitation from prior level of function to improve safety and function with community mobility.   (Progressing)       Start:  06/25/24    Expected End:  08/24/24

## 2024-07-31 ENCOUNTER — TREATMENT (OUTPATIENT)
Dept: PHYSICAL THERAPY | Facility: CLINIC | Age: 74
End: 2024-07-31
Payer: MEDICARE

## 2024-07-31 DIAGNOSIS — G89.29 CHRONIC BILATERAL LOW BACK PAIN WITHOUT SCIATICA: ICD-10-CM

## 2024-07-31 DIAGNOSIS — M54.50 CHRONIC BILATERAL LOW BACK PAIN WITHOUT SCIATICA: ICD-10-CM

## 2024-07-31 PROCEDURE — 97110 THERAPEUTIC EXERCISES: CPT | Mod: GP | Performed by: PHYSICAL THERAPIST

## 2024-07-31 ASSESSMENT — PAIN SCALES - GENERAL: PAINLEVEL_OUTOF10: 0 - NO PAIN

## 2024-07-31 ASSESSMENT — PAIN - FUNCTIONAL ASSESSMENT: PAIN_FUNCTIONAL_ASSESSMENT: 0-10

## 2024-07-31 NOTE — PROGRESS NOTES
Physical Therapy    Physical Therapy Treatment    Patient Name: Obdulia Washington  MRN: [id number]  Today's Date: 7/31/2024  Time Calculation  Start Time: 0918  Stop Time: 0958  Time Calculation (min): 40 min     PT Therapeutic Procedures Time Entry  Therapeutic Exercise Time Entry: 40                   Current Problem  1. Chronic bilateral low back pain without sciatica  Follow Up In Physical Therapy        Problem List Items Addressed This Visit             ICD-10-CM    Chronic bilateral low back pain M54.50, G89.29       SUBJECTIVE  Subjective   General  Reason for Referral: back pain  Referred By: Dr. Johnny Arora  Past Medical History Relevant to Rehab: hx breast cancer, HTN  Precautions  Precautions  Precautions Comment: none       Pain  Pain Assessment: 0-10  0-10 (Numeric) Pain Score: 0 - No pain  Pain Location: Back    Patient reports that she feels the same. States that she has no pain when she isn't doing anything. States that she had pain with basic HHCs this weekend.   Patient reports compliance with HEP.     VISIT NUMBER: 8  EVAL 6/25/24   Re-Check 7/24/24  Auth not req, BMN, medicare     Treatments:  TherEx:  Stepper 5 min   IB 2x1 min   HSS 2x30 seconds   Shuttle 6 bands, 3x10   SL Shuttle 4 bands, 3x10 (B)   Seated Ball Roll Out 3 min ea Fwd, Side-Side   STS 1 AE, 3x10 NEW   Side Stepping Green, 2 laps  Monster walks Green, 2 laps   Stand Hip ABD/Ext/Flx Green, 2x10 ea (B)   Stand Hip ADD Green, 2x10 (B) NEW   RB 2x1 min   Slow March on AE 1 min HELD  Anti-Rotation Red, 5/5, 2 min (B) HELD         Objective   No objective measures assessed this visit       Assessment:     Progressed CKC strengthening this date without issue. Patient challenged d/t strength deficits. No pain reported. Requires vc for HKA alignment throughout STS. Patient educated on importance of strengthening to improve activity tolerance and jt stability with functional tasks to reduce pain and discomfort.     This session exercises  were progressed (p) or added (NEW) as documented in treatment section.  Pt required minimal to moderate cues and demonstration to complete exercises with proper form and responded without adverse effects.        Plan:     Continue to progress treatment to address strength, motion, jt mobility, soft tissue mobility, and flexibility deficits impacting patient's return to PLOF unrestricted and symptom free.     Goals:  Active       PT Problem       AURELIO 15% or less (Progressing)       Start:  06/25/24    Expected End:  08/24/24            Pt will demo improved MMT to at least 4/5 on 0-5 point scale in BLE for improved strength and stability, and improved ease with transfers, lifting/carrying, and proper mechanics with ADL's & IADL's.   (Progressing)       Start:  06/25/24    Expected End:  08/24/24            Patient will be able to lift 10 lbs from floor to waist without pain and good mechanics to allow for return to PLOF and ability to complete ADLs and HHCs without restriction.   (Progressing)       Start:  06/25/24    Expected End:  08/24/24            Patient will demonstrate improved standing tolerance to 60+ mins, independently, with good mechanics, and without assistance to allow for improved safety and function with ADLs, HHCs, and all functional mobility.    (Progressing)       Start:  06/25/24    Expected End:  08/24/24            Patient will demonstrate improved sitting tolerance to 60+ mins, independently, with maintenance of proper posture, and without pain during or with return to standing to improve ability to complete ADLs and HHCs at prior level  (Progressing)       Start:  06/25/24    Expected End:  08/24/24            Patient will be able to tolerate continuous ambulation for at least 60+ minutes or greater without pain or limitation from prior level of function to improve safety and function with community mobility.   (Progressing)       Start:  06/25/24    Expected End:  08/24/24

## 2024-08-02 ENCOUNTER — TREATMENT (OUTPATIENT)
Dept: PHYSICAL THERAPY | Facility: CLINIC | Age: 74
End: 2024-08-02
Payer: MEDICARE

## 2024-08-02 DIAGNOSIS — G89.29 CHRONIC BILATERAL LOW BACK PAIN WITHOUT SCIATICA: ICD-10-CM

## 2024-08-02 DIAGNOSIS — M54.50 CHRONIC BILATERAL LOW BACK PAIN WITHOUT SCIATICA: ICD-10-CM

## 2024-08-02 PROCEDURE — 97110 THERAPEUTIC EXERCISES: CPT | Mod: GP,KX | Performed by: PHYSICAL THERAPIST

## 2024-08-02 ASSESSMENT — PAIN SCALES - GENERAL: PAINLEVEL_OUTOF10: 0 - NO PAIN

## 2024-08-02 ASSESSMENT — PAIN - FUNCTIONAL ASSESSMENT: PAIN_FUNCTIONAL_ASSESSMENT: 0-10

## 2024-08-02 NOTE — PROGRESS NOTES
Physical Therapy    Physical Therapy Treatment    Patient Name: Obdulia Washington  MRN: [id number]  Today's Date: 8/2/2024  Time Calculation  Start Time: 0917  Stop Time: 0958  Time Calculation (min): 41 min     PT Therapeutic Procedures Time Entry  Therapeutic Exercise Time Entry: 41                   Current Problem  1. Chronic bilateral low back pain without sciatica  Follow Up In Physical Therapy        Problem List Items Addressed This Visit             ICD-10-CM    Chronic bilateral low back pain M54.50, G89.29       SUBJECTIVE  Subjective   General  Reason for Referral: back pain  Referred By: Dr. Johnny Arora  Past Medical History Relevant to Rehab: hx breast cancer, HTN  Precautions  Precautions  Precautions Comment: none       Pain  Pain Assessment: 0-10  0-10 (Numeric) Pain Score: 0 - No pain  Pain Location: Back    Patient reports that she was able to stand at counter to do house work without pain for the first time yesterday. States she still feels tired after PT but denies pain.   Patient reports compliance with HEP.     VISIT NUMBER: 9  EVAL 6/25/24   Re-Check 7/24/24  Auth not req, BMN, medicare     Treatments:  TherEx:  Stepper 5 min   IB 2x1 min   HSS 2x30 seconds   Shuttle 6 bands, 3x10   SL Shuttle 4 bands, 3x10 (B)   Seated Ball Roll Out 3 min ea Fwd, Side-Side   STS 1 AE, 3x10   Side Stepping Green, 2 laps  Monster walks Green, 2 laps   Stand Hip ABD/Ext/Flx Green, 2x10 ea (B)   Stand Hip ADD Green, 2x10 (B)   RB 2x1 min   Slow March on AE 1 min HELD  Anti-Rotation Red, 5/5, 2 min (B) HELD       Objective   No objective measures assessed this visit     Assessment:     Patient tolerates session well. [place] improving stability on balance board this date. Patient appropriately challenged to address strength deficits and is progressing steadily. Continue to progress as tolerated.     This session exercises were progressed (p) or added (NEW) as documented in treatment section.  Pt required minimal to  moderate cues and demonstration to complete exercises with proper form and responded without adverse effects.        Plan:     Continue to progress treatment to address strength, motion, jt mobility, soft tissue mobility, and flexibility deficits impacting patient's return to PLOF unrestricted and symptom free.     Evaluating therapist, Traci Soria PT, is moving and will no longer be working at this site; therefore, effective immediately Efrain Coburn PT will be PT in charge of this patient's POC moving forward.     Goals:  Active       PT Problem       AURELIO 15% or less (Progressing)       Start:  06/25/24    Expected End:  08/24/24            Pt will demo improved MMT to at least 4/5 on 0-5 point scale in BLE for improved strength and stability, and improved ease with transfers, lifting/carrying, and proper mechanics with ADL's & IADL's.   (Progressing)       Start:  06/25/24    Expected End:  08/24/24            Patient will be able to lift 10 lbs from floor to waist without pain and good mechanics to allow for return to PLOF and ability to complete ADLs and HHCs without restriction.   (Progressing)       Start:  06/25/24    Expected End:  08/24/24            Patient will demonstrate improved standing tolerance to 60+ mins, independently, with good mechanics, and without assistance to allow for improved safety and function with ADLs, HHCs, and all functional mobility.    (Progressing)       Start:  06/25/24    Expected End:  08/24/24            Patient will demonstrate improved sitting tolerance to 60+ mins, independently, with maintenance of proper posture, and without pain during or with return to standing to improve ability to complete ADLs and HHCs at prior level  (Progressing)       Start:  06/25/24    Expected End:  08/24/24            Patient will be able to tolerate continuous ambulation for at least 60+ minutes or greater without pain or limitation from prior level of function to improve safety and  function with community mobility.   (Progressing)       Start:  06/25/24    Expected End:  08/24/24

## 2024-08-07 ENCOUNTER — APPOINTMENT (OUTPATIENT)
Dept: PHYSICAL THERAPY | Facility: CLINIC | Age: 74
End: 2024-08-07
Payer: MEDICARE

## 2024-08-08 ENCOUNTER — TREATMENT (OUTPATIENT)
Dept: PHYSICAL THERAPY | Facility: CLINIC | Age: 74
End: 2024-08-08
Payer: MEDICARE

## 2024-08-08 DIAGNOSIS — G89.29 CHRONIC BILATERAL LOW BACK PAIN WITHOUT SCIATICA: ICD-10-CM

## 2024-08-08 DIAGNOSIS — M54.50 CHRONIC BILATERAL LOW BACK PAIN WITHOUT SCIATICA: ICD-10-CM

## 2024-08-08 PROCEDURE — 97110 THERAPEUTIC EXERCISES: CPT | Mod: GP,KX | Performed by: PHYSICAL THERAPIST

## 2024-08-08 NOTE — PROGRESS NOTES
Physical Therapy    Physical Therapy Treatment    Patient Name: Obdulia Washington  MRN: [id number]  Today's Date: 8/8/2024  Visit #10  Time Calculation  Start Time: 1030  Stop Time: 1110  Time Calculation (min): 40 min     PT Therapeutic Procedures Time Entry  Therapeutic Exercise Time Entry: 40        Payor: MEDICARE / Plan: MEDICARE PART A AND B / Product Type: *No Product type* /   Referred by:Johnny Arora  Current Problem  Problem List Items Addressed This Visit             ICD-10-CM    Chronic bilateral low back pain M54.50, G89.29        Subjective   Pt reports her back pain is not too bad  Pain: Better  Pt has been compliant with HEP Yes      Objective  No objective measures assessed this visit    Assessment:  Pt is progressing as expected towards goals. Able to complete exercises without difficulty today  This session exercises were progressed (p) or added (NEW) as documented in the treatment section.  The pt required min to mod cues and demonstration to complete exercises with proper form.    Pt responded to all activities without adverse effects.    Pt continues to lack strength necessary for the completion of baseline ADLs without pain.  Pt will benefit from further therapy to continue to progress towards meeting functional and impairment goals.    Plan:  Continue to progress treatment to improve strength, range of motion, joint mobility, pain, and flexibility impairments which are impacting patient's function.    Treatments:  Visit # 10  EVAL 6/25/24   Re-Check 7/24/24  Auth not req, BMN, medicare      Treatments:  TherEx:  Stepper 5 min   IB 1x1 min   HSS 2x30 seconds   Shuttle 6 bands, 3x10   SL Shuttle 4 bands, 3x10 (B)   Seated Ball Roll Out 3 min ea Fwd, Side-Side   STS 1 AE, 3x10   Side Stepping Green, 2 laps  Monster walks Green, 2 laps   Stand Hip ABD/Ext/Flx Green, 2x10 ea (B)   Stand Hip ADD Green, 2x10 (B)   RB 2x1 min   Slow March on AE 1 min   Anti-Rotation Red, 5/5, 2 min (B)  HELD  Goals:  Active       PT Problem       AURELIO 15% or less (Progressing)       Start:  06/25/24    Expected End:  08/24/24            Pt will demo improved MMT to at least 4/5 on 0-5 point scale in BLE for improved strength and stability, and improved ease with transfers, lifting/carrying, and proper mechanics with ADL's & IADL's.   (Progressing)       Start:  06/25/24    Expected End:  08/24/24            Patient will be able to lift 10 lbs from floor to waist without pain and good mechanics to allow for return to PLOF and ability to complete ADLs and HHCs without restriction.   (Progressing)       Start:  06/25/24    Expected End:  08/24/24            Patient will demonstrate improved standing tolerance to 60+ mins, independently, with good mechanics, and without assistance to allow for improved safety and function with ADLs, HHCs, and all functional mobility.    (Progressing)       Start:  06/25/24    Expected End:  08/24/24            Patient will demonstrate improved sitting tolerance to 60+ mins, independently, with maintenance of proper posture, and without pain during or with return to standing to improve ability to complete ADLs and HHCs at prior level  (Progressing)       Start:  06/25/24    Expected End:  08/24/24            Patient will be able to tolerate continuous ambulation for at least 60+ minutes or greater without pain or limitation from prior level of function to improve safety and function with community mobility.   (Progressing)       Start:  06/25/24    Expected End:  08/24/24

## 2024-08-09 ENCOUNTER — APPOINTMENT (OUTPATIENT)
Dept: PHYSICAL THERAPY | Facility: CLINIC | Age: 74
End: 2024-08-09
Payer: MEDICARE

## 2024-08-12 DIAGNOSIS — I10 HYPERTENSION, UNSPECIFIED TYPE: ICD-10-CM

## 2024-08-12 RX ORDER — LOSARTAN POTASSIUM 50 MG/1
50 TABLET ORAL 2 TIMES DAILY
Qty: 180 TABLET | Refills: 3 | Status: SHIPPED | OUTPATIENT
Start: 2024-08-12 | End: 2025-08-12

## 2024-08-13 ENCOUNTER — TREATMENT (OUTPATIENT)
Dept: PHYSICAL THERAPY | Facility: CLINIC | Age: 74
End: 2024-08-13
Payer: MEDICARE

## 2024-08-13 DIAGNOSIS — G89.29 CHRONIC BILATERAL LOW BACK PAIN WITHOUT SCIATICA: ICD-10-CM

## 2024-08-13 DIAGNOSIS — M54.50 CHRONIC BILATERAL LOW BACK PAIN WITHOUT SCIATICA: ICD-10-CM

## 2024-08-13 PROCEDURE — 97110 THERAPEUTIC EXERCISES: CPT | Mod: GP,KX | Performed by: PHYSICAL THERAPIST

## 2024-08-13 NOTE — PROGRESS NOTES
Physical Therapy    Physical Therapy Treatment    Patient Name: Obdulia Washington  MRN: [id number]  Today's Date: 8/13/2024  Visit #11  Time Calculation  Start Time: 1030  Stop Time: 1115  Time Calculation (min): 45 min     PT Therapeutic Procedures Time Entry  Therapeutic Exercise Time Entry: 45        Payor: MEDICARE / Plan: MEDICARE PART A AND B / Product Type: *No Product type* /   Referred by:Johnny Arora  Current Problem  Problem List Items Addressed This Visit             ICD-10-CM    Chronic bilateral low back pain M54.50, G89.29        Subjective   Pt reports her pain is dissipating  Pain: Better  Pt has been compliant with HEP Yes      Objective  No objective measures assessed this visit    Assessment:  Pt is progressing as expected towards goals. Pt has less pain.  Becoming independent with exercises  This session exercises were progressed (p) or added (NEW) as documented in the treatment section.  The pt required min to mod cues and demonstration to complete exercises with proper form.    Pt responded to all activities without adverse effects.    Pt continues to lack strength necessary for the completion of baseline ADLs without pain.  Pt will benefit from further therapy to continue to progress towards meeting functional and impairment goals.    Plan:  Continue to progress treatment to improve strength, range of motion, joint mobility, pain, and flexibility impairments which are impacting patient's function.    Treatments:  Visit # 11    EVAL 6/25/24   Re-Check 7/24/24  Auth not req, BMN, medicare      Treatments:  TherEx:  Stepper 5 min   IB 1x1 min   HSS 2x30 seconds   Shuttle 6 bands, 3x10   SL Shuttle 4 bands, 3x10 (B)   Seated Ball Roll Out 3 min ea Fwd, Side-Side   STS 1 AE, 3x10   Side Stepping Green, 2 laps  Monster walks Green, 2 laps   Stand Hip ABD/Ext/Flx Green, 2x10 ea (B)   Stand Hip ADD Green, 2x10 (B)   RB 2x1 min   Slow March on AE 1 min   Tandem airex 1 min  Anti-Rotation Red, 5/5, 2  min (B) HELD  Goals:  Active       PT Problem       AURELIO 15% or less (Progressing)       Start:  06/25/24    Expected End:  08/24/24            Pt will demo improved MMT to at least 4/5 on 0-5 point scale in BLE for improved strength and stability, and improved ease with transfers, lifting/carrying, and proper mechanics with ADL's & IADL's.   (Progressing)       Start:  06/25/24    Expected End:  08/24/24            Patient will be able to lift 10 lbs from floor to waist without pain and good mechanics to allow for return to PLOF and ability to complete ADLs and HHCs without restriction.   (Progressing)       Start:  06/25/24    Expected End:  08/24/24            Patient will demonstrate improved standing tolerance to 60+ mins, independently, with good mechanics, and without assistance to allow for improved safety and function with ADLs, HHCs, and all functional mobility.    (Progressing)       Start:  06/25/24    Expected End:  08/24/24            Patient will demonstrate improved sitting tolerance to 60+ mins, independently, with maintenance of proper posture, and without pain during or with return to standing to improve ability to complete ADLs and HHCs at prior level  (Progressing)       Start:  06/25/24    Expected End:  08/24/24            Patient will be able to tolerate continuous ambulation for at least 60+ minutes or greater without pain or limitation from prior level of function to improve safety and function with community mobility.   (Progressing)       Start:  06/25/24    Expected End:  08/24/24

## 2024-08-14 ENCOUNTER — APPOINTMENT (OUTPATIENT)
Dept: PHYSICAL THERAPY | Facility: CLINIC | Age: 74
End: 2024-08-14
Payer: MEDICARE

## 2024-08-15 ENCOUNTER — TREATMENT (OUTPATIENT)
Dept: PHYSICAL THERAPY | Facility: CLINIC | Age: 74
End: 2024-08-15
Payer: MEDICARE

## 2024-08-15 DIAGNOSIS — G89.29 CHRONIC BILATERAL LOW BACK PAIN WITHOUT SCIATICA: Primary | ICD-10-CM

## 2024-08-15 DIAGNOSIS — M54.50 CHRONIC BILATERAL LOW BACK PAIN WITHOUT SCIATICA: Primary | ICD-10-CM

## 2024-08-15 PROCEDURE — 97110 THERAPEUTIC EXERCISES: CPT | Mod: GP,KX | Performed by: PHYSICAL THERAPIST

## 2024-08-15 NOTE — PROGRESS NOTES
Physical Therapy    Physical Therapy Treatment    Patient Name: Obdulia Washington  MRN: [id number]  Today's Date: 8/15/2024  Visit #12  Time Calculation  Start Time: 1030  Stop Time: 1113  Time Calculation (min): 43 min     PT Therapeutic Procedures Time Entry  Therapeutic Exercise Time Entry: 43        Payor: MEDICARE / Plan: MEDICARE PART A AND B / Product Type: *No Product type* /   Referred by:Johnny Arora  Current Problem  Problem List Items Addressed This Visit             ICD-10-CM    Chronic bilateral low back pain - Primary M54.50, G89.29        Subjective   Pt reports her back is pretty good.  She did a lot yesterday and her recovery time is less  Pain: Better  Pt has been compliant with HEP Yes      Objective  No objective measures assessed this visit    Assessment:  Pt is progressing as expected towards goals. Making good progress.  Would like to work on more independence with her program so we can transition her to a HEP  This session exercises were progressed (p) or added (NEW) as documented in the treatment section.  The pt required min to mod cues and demonstration to complete exercises with proper form.    Pt responded to all activities without adverse effects.    Pt continues to lack strength necessary for the completion of baseline ADLs without pain.  Pt will benefit from further therapy to continue to progress towards meeting functional and impairment goals.    Plan:  Continue to progress treatment to improve strength, range of motion, joint mobility, pain, and flexibility impairments which are impacting patient's function.    Treatments:  Visit # 12    EVAL 6/25/24   Re-Check 7/24/24  Auth not req, BMN, medicare      Treatments:  TherEx:  Stepper 5 min   IB 1x1 min   HSS 2x30 seconds   Shuttle 6 bands, 3x10   SL Shuttle 4 bands, 3x10 (B)   Seated Ball Roll Out 3 min ea Fwd, Side-Side   STS 1 AE, 3x10   Side Stepping Green, 2 laps  Monster walks Green, 2 laps   Stand Hip ABD/Ext/Flx Green, 2x10 ea  (B)   Stand Hip ADD Green, 2x10 (B)   RB 2x1 min   Slow March on AE 1 min   Tandem airex 1 min  Anti-Rotation Red, 5/5, 2 min (B) HELD  Goals:  Active       PT Problem       UARELIO 15% or less (Progressing)       Start:  06/25/24    Expected End:  08/24/24            Pt will demo improved MMT to at least 4/5 on 0-5 point scale in BLE for improved strength and stability, and improved ease with transfers, lifting/carrying, and proper mechanics with ADL's & IADL's.   (Progressing)       Start:  06/25/24    Expected End:  08/24/24            Patient will be able to lift 10 lbs from floor to waist without pain and good mechanics to allow for return to PLOF and ability to complete ADLs and HHCs without restriction.   (Progressing)       Start:  06/25/24    Expected End:  08/24/24            Patient will demonstrate improved standing tolerance to 60+ mins, independently, with good mechanics, and without assistance to allow for improved safety and function with ADLs, HHCs, and all functional mobility.    (Progressing)       Start:  06/25/24    Expected End:  08/24/24            Patient will demonstrate improved sitting tolerance to 60+ mins, independently, with maintenance of proper posture, and without pain during or with return to standing to improve ability to complete ADLs and HHCs at prior level  (Progressing)       Start:  06/25/24    Expected End:  08/24/24            Patient will be able to tolerate continuous ambulation for at least 60+ minutes or greater without pain or limitation from prior level of function to improve safety and function with community mobility.   (Progressing)       Start:  06/25/24    Expected End:  08/24/24

## 2024-08-16 ENCOUNTER — APPOINTMENT (OUTPATIENT)
Dept: PHYSICAL THERAPY | Facility: CLINIC | Age: 74
End: 2024-08-16
Payer: MEDICARE

## 2024-08-20 ENCOUNTER — TREATMENT (OUTPATIENT)
Dept: PHYSICAL THERAPY | Facility: CLINIC | Age: 74
End: 2024-08-20
Payer: MEDICARE

## 2024-08-20 DIAGNOSIS — M54.50 CHRONIC BILATERAL LOW BACK PAIN WITHOUT SCIATICA: ICD-10-CM

## 2024-08-20 DIAGNOSIS — G89.29 CHRONIC BILATERAL LOW BACK PAIN WITHOUT SCIATICA: ICD-10-CM

## 2024-08-20 PROCEDURE — 97110 THERAPEUTIC EXERCISES: CPT | Mod: GP,KX | Performed by: PHYSICAL THERAPIST

## 2024-08-21 ENCOUNTER — APPOINTMENT (OUTPATIENT)
Dept: PHYSICAL THERAPY | Facility: CLINIC | Age: 74
End: 2024-08-21
Payer: MEDICARE

## 2024-08-22 ENCOUNTER — TREATMENT (OUTPATIENT)
Dept: PHYSICAL THERAPY | Facility: CLINIC | Age: 74
End: 2024-08-22
Payer: MEDICARE

## 2024-08-22 DIAGNOSIS — G89.29 CHRONIC BILATERAL LOW BACK PAIN WITHOUT SCIATICA: ICD-10-CM

## 2024-08-22 DIAGNOSIS — M54.50 CHRONIC BILATERAL LOW BACK PAIN WITHOUT SCIATICA: ICD-10-CM

## 2024-08-22 PROCEDURE — 97110 THERAPEUTIC EXERCISES: CPT | Mod: GP,KX | Performed by: PHYSICAL THERAPIST

## 2024-08-22 NOTE — PROGRESS NOTES
Physical Therapy    Physical Therapy Treatment    Patient Name: Obdulia Washington  MRN: [id number]  Today's Date: 8/22/2024  Visit #14  Time Calculation  Start Time: 0917  Stop Time: 1002  Time Calculation (min): 45 min     PT Therapeutic Procedures Time Entry  Therapeutic Exercise Time Entry: 45        Payor: MEDICARE / Plan: MEDICARE PART A AND B / Product Type: *No Product type* /   Referred by:Johnny Arora  Current Problem  Problem List Items Addressed This Visit             ICD-10-CM    Chronic bilateral low back pain M54.50, G89.29        Subjective   Pt reports she is getting better. Able to do laundry without increased pain.  However has not done anything heavy.   Pain: Better  Pt has been compliant with HEP Yes      Objective  No objective measures assessed this visit    Assessment:  Pt is progressing as expected towards goals. Given updated HEP.  Advised that we need to work towards independence with this program.  Pt has improved function and less pain and is progressing towards her goals  This session exercises were progressed (p) or added (NEW) as documented in the treatment section.  The pt required min to mod cues and demonstration to complete exercises with proper form.    Pt responded to all activities without adverse effects.    Pt continues to lack strength necessary for the completion of baseline ADLs without pain.  Pt will benefit from further therapy to continue to progress towards meeting functional and impairment goals.    Plan:  Continue to progress treatment to improve strength, range of motion, joint mobility, pain, and flexibility impairments which are impacting patient's function.    Treatments:  Visit # 14    EVAL 6/25/24   Re-Check 7/24/24  Discharge to be done 8/30/24  Auth not req, BMN, medicare      TherEx:  Stepper 5 min   IB 1x1 min   HSS 2x30 seconds   Shuttle 6 bands, 3x10   SL Shuttle 4 bands, 3x10 (B)   Seated Ball Roll Out 3 min ea Fwd, Side-Side   STS 1 AE, 3x10   Side  Stepping Green, 2 laps  Monster walks Green, 2 laps   Stand Hip ABD/Ext/Flx Green, 2x10 ea (B)   Stand Hip ADD Green, 2x10 (B)   RB 2x1 min   Slow March on AE 1 min   Tandem airex 1 min  Anti-Rotation Red, 5/5, 2 min (B) HELD    Access Code: [email or web address]  URL: [email or web address]  Date: 08/22/2024  Prepared by: Efrain Coburn    Exercises  - Hip Extension with Resistance Loop  - 1 x daily - 7 x weekly - 3 sets - 10 reps  - Hip Abduction with Resistance Loop  - 1 x daily - 7 x weekly - 3 sets - 10 reps  - Forward Monster Walks  - 1 x daily - 7 x weekly - 3 sets - 10 reps  - Side Stepping with Resistance at Ankles  - 1 x daily - 7 x weekly - 3 sets - 10 reps  - Standing Hamstring Stretch on Chair  - 1 x daily - 7 x weekly - 1 sets - 1 reps - 1 min hold  - Standing Bilateral Gastroc Stretch with Step  - 1 x daily - 7 x weekly - 1 sets - 1 reps - 1 min hold  - Seated Flexion Stretch with Swiss Ball  - 1 x daily - 7 x weekly - 3 sets - 10 reps  - Sit to Stand with Hands on Knees  - 1 x daily - 7 x weekly - 3 sets - 10 reps  - Standing Anti-Rotation Press with Anchored Resistance  - 1 x daily - 7 x weekly - 3 sets - 10 reps  Goals:  Active       PT Problem       AURELIO 15% or less (Progressing)       Start:  06/25/24    Expected End:  08/24/24            Pt will demo improved MMT to at least 4/5 on 0-5 point scale in BLE for improved strength and stability, and improved ease with transfers, lifting/carrying, and proper mechanics with ADL's & IADL's.   (Progressing)       Start:  06/25/24    Expected End:  08/24/24            Patient will be able to lift 10 lbs from floor to waist without pain and good mechanics to allow for return to PLOF and ability to complete ADLs and HHCs without restriction.   (Progressing)       Start:  06/25/24    Expected End:  08/24/24            Patient will demonstrate improved standing tolerance to 60+ mins, independently, with good mechanics, and without assistance to allow for  improved safety and function with ADLs, HHCs, and all functional mobility.    (Progressing)       Start:  06/25/24    Expected End:  08/24/24            Patient will demonstrate improved sitting tolerance to 60+ mins, independently, with maintenance of proper posture, and without pain during or with return to standing to improve ability to complete ADLs and HHCs at prior level  (Progressing)       Start:  06/25/24    Expected End:  08/24/24            Patient will be able to tolerate continuous ambulation for at least 60+ minutes or greater without pain or limitation from prior level of function to improve safety and function with community mobility.   (Progressing)       Start:  06/25/24    Expected End:  08/24/24

## 2024-08-23 ENCOUNTER — APPOINTMENT (OUTPATIENT)
Dept: PHYSICAL THERAPY | Facility: CLINIC | Age: 74
End: 2024-08-23
Payer: MEDICARE

## 2024-08-23 DIAGNOSIS — K21.9 GASTROESOPHAGEAL REFLUX DISEASE WITHOUT ESOPHAGITIS: ICD-10-CM

## 2024-08-23 RX ORDER — OMEPRAZOLE 20 MG/1
20 CAPSULE, DELAYED RELEASE ORAL DAILY
Qty: 90 CAPSULE | Refills: 3 | Status: SHIPPED | OUTPATIENT
Start: 2024-08-23 | End: 2025-08-23

## 2024-08-28 ENCOUNTER — TREATMENT (OUTPATIENT)
Dept: PHYSICAL THERAPY | Facility: CLINIC | Age: 74
End: 2024-08-28
Payer: MEDICARE

## 2024-08-28 DIAGNOSIS — M54.9 CHRONIC BILATERAL BACK PAIN, UNSPECIFIED BACK LOCATION: ICD-10-CM

## 2024-08-28 DIAGNOSIS — G89.29 CHRONIC BILATERAL BACK PAIN, UNSPECIFIED BACK LOCATION: ICD-10-CM

## 2024-08-28 DIAGNOSIS — G89.29 CHRONIC BILATERAL LOW BACK PAIN WITHOUT SCIATICA: Primary | ICD-10-CM

## 2024-08-28 DIAGNOSIS — M54.50 CHRONIC BILATERAL LOW BACK PAIN WITHOUT SCIATICA: Primary | ICD-10-CM

## 2024-08-28 PROCEDURE — 97110 THERAPEUTIC EXERCISES: CPT | Mod: GP,KX | Performed by: PHYSICAL THERAPIST

## 2024-08-28 NOTE — PROGRESS NOTES
Physical Therapy    Physical Therapy Treatment    Patient Name: Obdulia Washington  MRN: [id number]  Today's Date: 8/28/2024  Visit #15  Time Calculation  Start Time: 1015  Stop Time: 1100  Time Calculation (min): 45 min     PT Therapeutic Procedures Time Entry  Therapeutic Exercise Time Entry: 45        Payor: MEDICARE / Plan: MEDICARE PART A AND B / Product Type: *No Product type* /   Referred by:Johnny Arora  Current Problem  Problem List Items Addressed This Visit             ICD-10-CM    Chronic bilateral low back pain - Primary M54.50, G89.29     Other Visit Diagnoses         Codes    Chronic bilateral back pain, unspecified back location     M54.9, G89.29             Subjective   Pt reports she is doing OK today.  Saturday she felt good and did a moderate amount of jobs and Sunday and Monday it went back down hill again  Pain: Better  Pt has been compliant with HEP Yes      Objective  No objective measures assessed this visit    Assessment:  Pt is progressing as expected towards goals. Pt ind with program  This session exercises were progressed (p) or added (NEW) as documented in the treatment section.  The pt required min to mod cues and demonstration to complete exercises with proper form.    Pt responded to all activities without adverse effects.      Pt will benefit from further therapy to continue to progress towards meeting functional and impairment goals.    Plan:  Continue to progress treatment to improve strength, range of motion, joint mobility, pain, and flexibility impairments which are impacting patient's function.    Treatments:  Visit # 15  EVAL 6/25/24   Re-Check 7/24/24  Discharge to be done 8/30/24  Auth not req, BMN, medicare      TherEx:  Stepper 5 min   IB 1x1 min   HSS 2x30 seconds   Shuttle 6 bands, 3x10   SL Shuttle 4 bands, 3x10 (B)   Seated Ball Roll Out 3 min ea Fwd, Side-Side   STS 1 AE, 3x10   Side Stepping Green, 2 laps  Monster walks Green, 2 laps   Stand Hip ABD/Ext/Flx Green,  2x10 ea (B)   Stand Hip ADD Green, 2x10 (B)   RB 2x1 min   Slow March on AE 1 min   Tandem airex 1 min  Anti-Rotation Red, 5/5, 2 min (B) HELD     Access Code: [email or web address]  URL: [email or web address]  Date: 08/22/2024  Prepared by: Efrain Coburn  Goals:  Active       PT Problem       AURELIO 15% or less (Progressing)       Start:  06/25/24    Expected End:  08/24/24            Pt will demo improved MMT to at least 4/5 on 0-5 point scale in BLE for improved strength and stability, and improved ease with transfers, lifting/carrying, and proper mechanics with ADL's & IADL's.   (Progressing)       Start:  06/25/24    Expected End:  08/24/24            Patient will be able to lift 10 lbs from floor to waist without pain and good mechanics to allow for return to PLOF and ability to complete ADLs and HHCs without restriction.   (Progressing)       Start:  06/25/24    Expected End:  08/24/24            Patient will demonstrate improved standing tolerance to 60+ mins, independently, with good mechanics, and without assistance to allow for improved safety and function with ADLs, HHCs, and all functional mobility.    (Progressing)       Start:  06/25/24    Expected End:  08/24/24            Patient will demonstrate improved sitting tolerance to 60+ mins, independently, with maintenance of proper posture, and without pain during or with return to standing to improve ability to complete ADLs and HHCs at prior level  (Progressing)       Start:  06/25/24    Expected End:  08/24/24            Patient will be able to tolerate continuous ambulation for at least 60+ minutes or greater without pain or limitation from prior level of function to improve safety and function with community mobility.   (Progressing)       Start:  06/25/24    Expected End:  08/24/24

## 2024-08-30 ENCOUNTER — TREATMENT (OUTPATIENT)
Dept: PHYSICAL THERAPY | Facility: CLINIC | Age: 74
End: 2024-08-30
Payer: MEDICARE

## 2024-08-30 DIAGNOSIS — G89.29 CHRONIC BILATERAL LOW BACK PAIN WITHOUT SCIATICA: ICD-10-CM

## 2024-08-30 DIAGNOSIS — M54.50 CHRONIC BILATERAL LOW BACK PAIN WITHOUT SCIATICA: ICD-10-CM

## 2024-08-30 PROCEDURE — 97110 THERAPEUTIC EXERCISES: CPT | Mod: GP,KX | Performed by: PHYSICAL THERAPIST

## 2024-08-30 NOTE — PROGRESS NOTES
Physical Therapy    Physical Therapy Treatment and Discharge    Patient Name: Obdulia Washington  MRN: [id number]  Today's Date: 8/30/2024  Visit #16  Time Calculation  Start Time: 0931  Stop Time: 1011  Time Calculation (min): 40 min     PT Therapeutic Procedures Time Entry  Therapeutic Exercise Time Entry: 40        Payor: MEDICARE / Plan: MEDICARE PART A AND B / Product Type: *No Product type* /   Current Problem  Problem List Items Addressed This Visit             ICD-10-CM    Chronic bilateral low back pain M54.50, G89.29        Subjective   Pt reports her back pain still comes and goes but it is much better  Pain: Better  Pt has been compliant with HEP Yes    Objective:  See goals for updated objective measures and progress since eval    Lumbar ROM:           Date:  6/25/24 7/24/24 8/30/24    Flexion 85% 90% 95%    Extension 75% PDM 75% 75%      RIGHT LEFT RIGHT LEFT RIGHT LEFT   Side Bend 100% 100% WNL WNL     Rotation 100% 100% WNL WNL                   Lower Extremity Strength:            Date 6/25/24 7/24/24 8/30/24      Myotome RIGHT LEFT RIGHT LEFT RIGHT LEFT   Hip Flexion L1,2 4/5 4/5 4/5 4/5 4 4   Hip Extension  --- 3+/5 3+/5 4-/5 4-/5 4 4   Hip Abduction  --- 3+/5 3+/5 3+/5 3+/5 4 4   Hip Adduction  --- 4/5 3+/5 4/5 4/5 4 4   Knee Extension L3 5/5 5/5 5/5 5/5 4+ 4+   Knee Flexion S2 5/5 5/5 5/5 5/5 4+ 4+      Outcome Measures:  Other Measures  Oswestry Disablity Index (AURELIO): 18%     Assessment:  Pt was referred for back pain  by Johnny Arora  The pt was initially seen on 6/25/24 and has completed  16 visits. Pt has progressed well.  The pt has reduced their pain, increased strength/ROM and improved their function. The pt has completed exercises, balance activities, and functional activities with instruction for correct progression to help achieve their goals.   The goals have all been met or mostly met. Pt has progressed towards independence  and is currently safe to continue with their HEP. No further  PT needs    Plan:  The POC is discharged as of today.    Rehab Discharge Reason: Achieved all and/or the most significant goals(s)  Treatments:    EVAL 6/25/24   Re-Check 7/24/24  Discharge to be done 8/30/24  Auth not req, BMN, medicare      TherEx:  Stepper 5 min   IB 1x1 min   HSS 2x30 seconds   Shuttle 6 bands, 3x10   SL Shuttle 4 bands, 3x10 (B)   Seated Ball Roll Out 3 min ea Fwd, Side-Side   STS 1 AE, 3x10   Side Stepping Green, 2 laps  Monster walks Green, 2 laps   Stand Hip ABD/Ext/Flx Green, 2x10 ea (B)   Stand Hip ADD Green, 2x10 (B)   RB 2x1 min   Slow March on AE 1 min   Tandem airex 1 min  Anti-Rotation Red, 5/5, 2 min (B) HELD     Access Code: [email or web address]  URL: [email or web address]  Date: 08/22/2024  Prepared by: Efrain Coburn  Goals:  Active       PT Problem       AURELIO 15% or less (Progressing)       Start:  06/25/24    Expected End:  08/24/24            Pt will demo improved MMT to at least 4/5 on 0-5 point scale in BLE for improved strength and stability, and improved ease with transfers, lifting/carrying, and proper mechanics with ADL's & IADL's.   (Progressing)       Start:  06/25/24    Expected End:  08/24/24            Patient will be able to lift 10 lbs from floor to waist without pain and good mechanics to allow for return to PLOF and ability to complete ADLs and HHCs without restriction.   (Progressing)       Start:  06/25/24    Expected End:  08/24/24         Goal Note       Doing laundry              Patient will demonstrate improved standing tolerance to 60+ mins, independently, with good mechanics, and without assistance to allow for improved safety and function with ADLs, HHCs, and all functional mobility.    (Progressing)       Start:  06/25/24    Expected End:  08/24/24         Goal Note       Pt reports it is better but can not stand for more than 10-15 min.  She can run the vacuum, fold laundry or empty the               Patient will demonstrate improved  sitting tolerance to 60+ mins, independently, with maintenance of proper posture, and without pain during or with return to standing to improve ability to complete ADLs and HHCs at prior level  (Met)       Start:  06/25/24    Expected End:  08/24/24    Resolved:  08/30/24      Goal Note       No problem              Patient will be able to tolerate continuous ambulation for at least 60+ minutes or greater without pain or limitation from prior level of function to improve safety and function with community mobility.   (Met)       Start:  06/25/24    Expected End:  08/24/24    Resolved:  08/30/24      Goal Note       Pt is doing the fall hiking spree

## 2024-09-03 NOTE — PROGRESS NOTES
Obdulia Washington female   1950 74 y.o.   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 10/3/2019 Aubree Valdez performed left breast Magseed localized partial mastectomy and sentinel lymph node biopsy. Final pathology revealed left breast invasive ductal carcinoma, grade 2, greatest dimension 0.5cm, sentinel lymph nodes, negative (0/2), margins negative. Patient's case was presented at tumor board with recommendations for consideration of post-operative radiation and endocrine therapy. She declined radiation. 10/15/2019 Anastrazole was initiated under the direction of Netta Palmer with five year plan. She is currently taking and tolerating well with some weight gain. She presents today for annual mammogram and exam. She denies any new masses or lumps.   Stage [id number]     She gets anxious when returning for mammograms since her diagnosis. She prefers to stay with a Breast Specialist.      BREAST IMAGIN2023 Bilateral diagnostic mammogram,indicates BI-RADS Category 2.     FEMALE HISTORY: menarche age 11, nullipara, previous OCPs, surgical menopause age 41, no HRT, heterogeneously dense tissue     FAMILY CANCER HISTORY:   Maternal Grandmother: Breast cancer, 90  Mother: Colon cancer, 75  Father: Esophageal cancer, 75       Surgical History  She has a past surgical history that includes Hysterectomy (2014); Other surgical history (10/17/2019); Colonoscopy (2016); and Breast lumpectomy.     Social History  She reports that she quit smoking about 42 years ago. Her smoking use included cigarettes. She started smoking about 62 years ago. She has a 30 pack-year smoking history. She has been exposed to tobacco smoke. She has never used smokeless tobacco.  She reports current alcohol use. She reports that she does not use drugs.    Family History  Family History   Problem Relation Name Age of Onset    Colon cancer Mother      Esophageal cancer Father          Allergies  Azithromycin    Medications  Current Outpatient Medications   Medication Instructions    amLODIPine (NORVASC) 2.5 mg, oral, 2 times daily    ammonium lactate (Lac-Hydrin) 12 % lotion APPLY TO THE AFFECTED AREA(S) OF the body TWICE DAILY    anastrozole (ARIMIDEX) 1 mg, oral, Daily    b complex 0.4 mg tablet 1 tablet, oral, Daily    CALCIUM ACETATE ORAL oral    chlorthalidone (HYGROTON) 25 mg, oral, Daily    cholecalciferol (VITAMIN D-3) 50,000 Units, oral, Once Weekly    docosahexaenoic acid/vit C/lut (EYE HEALTH ORAL) oral    losartan (COZAAR) 50 mg, oral, 2 times daily    magnesium 250 mg tablet oral    metroNIDAZOLE 1 % gel with pump apply a thin layer to full face TWICE DAILY    naproxen sodium (ALEVE ORAL) oral    omeprazole (PRILOSEC) 20 mg, oral, Daily         REVIEW OF SYSTEMS    Constitutional:  Negative for appetite change, fatigue, fever and unexpected weight change.   HENT:  Negative for ear pain, hearing loss, nosebleeds, sore throat and trouble swallowing.    Eyes:  Negative for discharge, itching and visual disturbance.   Respiratory:  Negative for cough, chest tightness and shortness of breath.    Cardiovascular:  Negative for chest pain, palpitations and leg swelling.   Breast: as indicated in HPI  Gastrointestinal:  Negative for abdominal pain, constipation, diarrhea and nausea.   Endocrine: Negative for cold intolerance and heat intolerance.   Genitourinary:  Negative for dysuria, frequency, hematuria, pelvic pain and vaginal bleeding.   Musculoskeletal:  Negative for arthralgias, back pain, gait problem, joint swelling and myalgias.   Skin:  Negative for color change and rash.   Allergic/Immunologic: Negative for environmental allergies and food allergies.   Neurological:  Negative  for dizziness, tremors, speech difficulty, weakness, numbness and headaches.   Hematological:  Does not bruise/bleed easily.   Psychiatric/Behavioral:  Negative for agitation, dysphoric mood and sleep disturbance. The patient is not nervous/anxious.         Past Medical History  She has a past medical history of Breast cancer (Multi), Diarrhea (05/26/2023), and Personal history of other specified conditions (09/09/2019).     Physical Exam  Patient is alert and oriented x3 and in a relaxed and appropriate mood. Her gait is steady and hand grasps are equal. Sclera is clear. The breasts are slightly asymmetrical. The left breast has a well-healed circumareolar and left axiliary incision. The tissue of both breasts is soft without palpable abnormalities, discrete nodules or masses. The skin and nipples appear normal. There is no cervical, supraclavicular or axillary lymphadenopathy. Heart rate and rhythm normal, S1 and S2 appreciated. The lungs are clear to auscultation bilaterally. Abdomen is soft and non-tender.       Physical Exam  Chest:              Last Recorded Vitals  Vitals:    09/18/24 0958   BP: 107/56   Pulse: 86   Resp: 16       Relevant Results   Time was spent discussing digital images of the radiology testing with the patient. I explained the results in depth, along with suggested explanation for follow up recommendations based on the testing results. BI-RADS Category 2    Imaging    Narrative & Impression   Interpreted By:  Dino Donohue and Jiang Sirui   STUDY:  BI MAMMO BILATERAL DIAGNOSTIC TOMOSYNTHESIS;  9/18/2024 10:33 am      ACCESSION NUMBER(S):  [id number]      ORDERING CLINICIAN:  BIPIN LEVY      INDICATION:  Annual. History of left breast lumpectomy.      ,Z08 Encounter for follow-up examination after completed treatment  for malignant neoplasm,Z85.3 Personal history of malignant neoplasm  of breast      COMPARISON:  09/14/2023, 09/13/2022, and 09/07/2020.      FINDINGS:  MAMMOGRAPHY:  2D and tomosynthesis images were reviewed at 1 mm slice  thickness.      Density:  The breasts are heterogeneously dense, which may obscure  small masses.      Stable postsurgical changes of the central medial left breast at  posterior depth. No suspicious masses or calcifications are  identified.      IMPRESSION:  No mammographic evidence of malignancy. Stable left breast  posttreatment changes.      BI-RADS CATEGORY:  BI-RADS Category:  2 Benign.  Recommendation:  Annual Screening.  Recommended Date:  1 Year.  Laterality:  Bilateral.       Assessment/Plan   Normal clinical exam and imaging, history of left invasive cancer, Anastrazole therapy, family history of breast cancer, heterogeneously dense tissue     Plan: Return in one year for bilateral screening mammogram and office visit. Continue Anastrazole 1mg daily until 10/15/2024.     Patient Discussion/Summary  Your clinical examination and imaging are normal. Please return in one year for bilateral screening mammogram and office visit or sooner if you have any problems or concerns. Continue Anastrazole 1mg daily until 10/15/2024.    You can see your health information, review clinical summaries from office visits & test results online when you follow your health with MY  Chart, a personal health record. To sign up go to [email or web address]. If you need assistance with signing up or trouble getting into your account call [employer] Patient Line 24/7 at [phone].    My office phone number is [phone] if you need to get in touch with me or have additional questions or concerns. Thank you for choosing [facility] and trusting me as your healthcare provider. I look forward to seeing you again at your next office visit. I am honored to be a provider on your health care team and I remain dedicated to helping you achieve your health goals.      Adina Irwin, APRN-CNP

## 2024-09-16 ENCOUNTER — APPOINTMENT (OUTPATIENT)
Dept: RADIOLOGY | Facility: CLINIC | Age: 74
End: 2024-09-16
Payer: MEDICARE

## 2024-09-16 ENCOUNTER — APPOINTMENT (OUTPATIENT)
Dept: SURGICAL ONCOLOGY | Facility: CLINIC | Age: 74
End: 2024-09-16
Payer: MEDICARE

## 2024-09-18 ENCOUNTER — HOSPITAL ENCOUNTER (OUTPATIENT)
Dept: RADIOLOGY | Facility: HOSPITAL | Age: 74
Discharge: HOME | End: 2024-09-18
Payer: MEDICARE

## 2024-09-18 ENCOUNTER — OFFICE VISIT (OUTPATIENT)
Dept: SURGICAL ONCOLOGY | Facility: HOSPITAL | Age: 74
End: 2024-09-18
Payer: MEDICARE

## 2024-09-18 VITALS
SYSTOLIC BLOOD PRESSURE: 107 MMHG | BODY MASS INDEX: 30.83 KG/M2 | DIASTOLIC BLOOD PRESSURE: 56 MMHG | HEART RATE: 86 BPM | RESPIRATION RATE: 16 BRPM | WEIGHT: 174 LBS | HEIGHT: 63 IN

## 2024-09-18 DIAGNOSIS — Z08 ENCOUNTER FOR FOLLOW-UP SURVEILLANCE OF BREAST CANCER: Primary | ICD-10-CM

## 2024-09-18 DIAGNOSIS — Z08 ENCOUNTER FOR FOLLOW-UP EXAMINATION AFTER COMPLETED TREATMENT FOR MALIGNANT NEOPLASM: ICD-10-CM

## 2024-09-18 DIAGNOSIS — Z85.3 PERSONAL HISTORY OF MALIGNANT NEOPLASM OF BREAST: ICD-10-CM

## 2024-09-18 DIAGNOSIS — Z79.811 USE OF ANASTROZOLE: ICD-10-CM

## 2024-09-18 DIAGNOSIS — Z12.31 ENCOUNTER FOR SCREENING MAMMOGRAM FOR MALIGNANT NEOPLASM OF BREAST: ICD-10-CM

## 2024-09-18 DIAGNOSIS — Z85.3 ENCOUNTER FOR FOLLOW-UP SURVEILLANCE OF BREAST CANCER: Primary | ICD-10-CM

## 2024-09-18 PROCEDURE — 3074F SYST BP LT 130 MM HG: CPT | Performed by: NURSE PRACTITIONER

## 2024-09-18 PROCEDURE — 1159F MED LIST DOCD IN RCRD: CPT | Performed by: NURSE PRACTITIONER

## 2024-09-18 PROCEDURE — 1123F ACP DISCUSS/DSCN MKR DOCD: CPT | Performed by: NURSE PRACTITIONER

## 2024-09-18 PROCEDURE — 77062 BREAST TOMOSYNTHESIS BI: CPT

## 2024-09-18 PROCEDURE — 3078F DIAST BP <80 MM HG: CPT | Performed by: NURSE PRACTITIONER

## 2024-09-18 PROCEDURE — 1036F TOBACCO NON-USER: CPT | Performed by: NURSE PRACTITIONER

## 2024-09-18 PROCEDURE — 99213 OFFICE O/P EST LOW 20 MIN: CPT | Performed by: NURSE PRACTITIONER

## 2024-09-18 NOTE — PATIENT INSTRUCTIONS
Your clinical examination and imaging are normal. Please return in one year for bilateral screening mammogram and office visit or sooner if you have any problems or concerns. Continue Anastrazole 1mg daily until 10/15/2024.    You can see your health information, review clinical summaries from office visits & test results online when you follow your health with MY  Chart, a personal health record. To sign up go to [email or web address]. If you need assistance with signing up or trouble getting into your account call [employer] Patient Line 24/7 at [phone].    My office phone number is [phone] if you need to get in touch with me or have additional questions or concerns. Thank you for choosing [facility] and trusting me as your healthcare provider. I look forward to seeing you again at your next office visit. I am honored to be a provider on your health care team and I remain dedicated to helping you achieve your health goals.

## 2024-10-05 ENCOUNTER — OFFICE VISIT (OUTPATIENT)
Dept: URGENT CARE | Age: 74
End: 2024-10-05
Payer: MEDICARE

## 2024-10-05 VITALS
HEIGHT: 63 IN | WEIGHT: 176.8 LBS | SYSTOLIC BLOOD PRESSURE: 150 MMHG | BODY MASS INDEX: 31.33 KG/M2 | RESPIRATION RATE: 16 BRPM | TEMPERATURE: 97.9 F | HEART RATE: 90 BPM | OXYGEN SATURATION: 98 % | DIASTOLIC BLOOD PRESSURE: 78 MMHG

## 2024-10-05 DIAGNOSIS — R30.0 BURNING WITH URINATION: ICD-10-CM

## 2024-10-05 LAB
POC APPEARANCE, URINE: CLEAR
POC BILIRUBIN, URINE: NEGATIVE
POC BLOOD, URINE: NEGATIVE
POC COLOR, URINE: NORMAL
POC GLUCOSE, URINE: NEGATIVE MG/DL
POC KETONES, URINE: NEGATIVE MG/DL
POC LEUKOCYTES, URINE: NEGATIVE
POC NITRITE,URINE: NEGATIVE
POC PH, URINE: 7.5 PH
POC PROTEIN, URINE: NEGATIVE MG/DL
POC SPECIFIC GRAVITY, URINE: 1.01
POC UROBILINOGEN, URINE: 0.2 EU/DL

## 2024-10-05 PROCEDURE — 87086 URINE CULTURE/COLONY COUNT: CPT

## 2024-10-05 NOTE — PROGRESS NOTES
"Subjective   Patient ID: Obdulia Washington is a 74 y.o. female. They present today with a chief complaint of UTI (Onset yesterday).    Patient disposition: Home    HISTORY OF PRESENT ILLNESS:    This is an older adult female with multiple comorbidities, reviewed. She feels dysuria and urinary urgency for 2 days and is concerned about UTI. She does not have a history of frequent UTIs previously. Denies rash, itching between urination. Denies abdominal or pelvic pain, f/c/s, n/v, GI sx, vaginal dc.        Past Medical History  Allergies as of 10/05/2024 - Reviewed 10/05/2024   Allergen Reaction Noted    Azithromycin Hives, Itching, and Rash 05/26/2023       (Not in a hospital admission)       Past Medical History:   Diagnosis Date    Breast cancer (Multi)     Diarrhea 05/26/2023    Personal history of other specified conditions 09/09/2019    History of abnormal mammogram       Past Surgical History:   Procedure Laterality Date    BREAST LUMPECTOMY      COLONOSCOPY  12/30/2016    Complete Colonoscopy    HYSTERECTOMY  03/19/2014    Hysterectomy    OTHER SURGICAL HISTORY  10/17/2019    Mastectomy partial        reports that she quit smoking about 42 years ago. Her smoking use included cigarettes. She started smoking about 62 years ago. She has a 30 pack-year smoking history. She has been exposed to tobacco smoke. She has never used smokeless tobacco. She reports current alcohol use. She reports that she does not use drugs.    Review of Systems    Negative except as documented in the History of Present Illness.                             Objective    Vitals:    10/05/24 1250   BP: 150/78   BP Location: Right arm   Patient Position: Sitting   BP Cuff Size: Adult   Pulse: 90   Resp: 16   Temp: 36.6 °C (97.9 °F)   TempSrc: Oral   SpO2: 98%   Weight: 80.2 kg (176 lb 12.8 oz)   Height: 1.6 m (5' 3\")     No LMP recorded. Patient has had a hysterectomy.      PHYSICAL EXAMINATION:    CONSTITUTIONAL: well-appearing, nontoxic, " ambulatory         EYES:   No scleral icterus or orbital trauma noted. No conjunctival injection. PERRLA. No nystagmus.         ENT:  Head and face are unremarkable and atraumatic. Mucous membranes moist. Nares are patent without copious rhinorrhea.         LUNGS:  CTAB, no r/r/w.         CARDIOVASCULAR:   RRR, no m/r/g. Nl S1/S2.         ABDOMEN:  Nontender, nondistended, with no obvious masses, and no peritoneal signs.         :  CVAT negative bilaterally.         MUSCULOSKELETAL: No obvious deformities. WISEMAN with equal strength. Gait [observed to be normal.]         SKIN:   Good color, with no significant rashes, pallor, cyanosis, wounds.         NEURO:  Normal baseline mental status. No obvious neurological deficits, normal sensation and strength bilaterally.         PSYCH: Appropriate mood and affect.         -----------------------------------         MDM: UA normal and urine culture pending. Discussed increased hydration and pt has appt to fu with PCP in 1 wk.          Procedures    Diagnostic study results (if any) were reviewed by Gabriele Thrasher PA-C.    Results for orders placed or performed in visit on 10/05/24   POCT UA Automated manually resulted   Result Value Ref Range    POC Color, Urine Light-Yellow Straw, Yellow, Light-Yellow    POC Appearance, Urine Clear Clear    POC Glucose, Urine NEGATIVE NEGATIVE mg/dl    POC Bilirubin, Urine NEGATIVE NEGATIVE    POC Ketones, Urine NEGATIVE NEGATIVE mg/dl    POC Specific Gravity, Urine 1.010 1.005 - 1.035    POC Blood, Urine NEGATIVE NEGATIVE    POC PH, Urine 7.5 No Reference Range Established PH    POC Protein, Urine NEGATIVE NEGATIVE, 30 (1+) mg/dl    POC Urobilinogen, Urine 0.2 0.2, 1.0 EU/DL    Poc Nitrite, Urine NEGATIVE NEGATIVE    POC Leukocytes, Urine NEGATIVE NEGATIVE        Assessment/Plan   Allergies, medications, history, and pertinent labs/EKGs/Imaging reviewed by Gabriele Thrasher PA-C.     Orders and Diagnoses  Diagnoses and all orders for this  visit:  Burning with urination  -     POCT UA Automated manually resulted      Medical Admin Record      Follow Up Instructions  No follow-ups on file.    Electronically signed by Gabriele Thrasher PA-C  1:20 PM

## 2024-10-07 ENCOUNTER — LAB (OUTPATIENT)
Dept: LAB | Facility: LAB | Age: 74
End: 2024-10-07
Payer: MEDICARE

## 2024-10-07 DIAGNOSIS — D50.9 IRON DEFICIENCY ANEMIA, UNSPECIFIED IRON DEFICIENCY ANEMIA TYPE: ICD-10-CM

## 2024-10-07 DIAGNOSIS — Z00.00 WELLNESS EXAMINATION: Primary | ICD-10-CM

## 2024-10-07 DIAGNOSIS — R73.9 ELEVATED BLOOD SUGAR LEVEL: ICD-10-CM

## 2024-10-07 DIAGNOSIS — E55.9 VITAMIN D DEFICIENCY: ICD-10-CM

## 2024-10-07 DIAGNOSIS — E78.2 MIXED HYPERLIPIDEMIA: ICD-10-CM

## 2024-10-07 DIAGNOSIS — E03.9 HYPOTHYROIDISM, UNSPECIFIED TYPE: ICD-10-CM

## 2024-10-07 LAB
25(OH)D3 SERPL-MCNC: 56 NG/ML (ref 30–100)
ALBUMIN SERPL BCP-MCNC: 4.3 G/DL (ref 3.4–5)
ALP SERPL-CCNC: 36 U/L (ref 33–136)
ALT SERPL W P-5'-P-CCNC: 26 U/L (ref 7–45)
ANION GAP SERPL CALC-SCNC: 15 MMOL/L (ref 10–20)
AST SERPL W P-5'-P-CCNC: 23 U/L (ref 9–39)
BACTERIA UR CULT: NORMAL
BASOPHILS # BLD AUTO: 0.04 X10*3/UL (ref 0–0.1)
BASOPHILS NFR BLD AUTO: 0.5 %
BILIRUB SERPL-MCNC: 0.4 MG/DL (ref 0–1.2)
BUN SERPL-MCNC: 22 MG/DL (ref 6–23)
CALCIUM SERPL-MCNC: 9.6 MG/DL (ref 8.6–10.6)
CHLORIDE SERPL-SCNC: 101 MMOL/L (ref 98–107)
CHOLEST SERPL-MCNC: 226 MG/DL (ref 0–199)
CHOLESTEROL/HDL RATIO: 3.5
CO2 SERPL-SCNC: 28 MMOL/L (ref 21–32)
CREAT SERPL-MCNC: 0.98 MG/DL (ref 0.5–1.05)
EGFRCR SERPLBLD CKD-EPI 2021: 60 ML/MIN/1.73M*2
EOSINOPHIL # BLD AUTO: 0.09 X10*3/UL (ref 0–0.4)
EOSINOPHIL NFR BLD AUTO: 1.1 %
ERYTHROCYTE [DISTWIDTH] IN BLOOD BY AUTOMATED COUNT: 13.2 % (ref 11.5–14.5)
EST. AVERAGE GLUCOSE BLD GHB EST-MCNC: 120 MG/DL
GLUCOSE SERPL-MCNC: 108 MG/DL (ref 74–99)
HBA1C MFR BLD: 5.8 %
HCT VFR BLD AUTO: 36.4 % (ref 36–46)
HDLC SERPL-MCNC: 64.1 MG/DL
HGB BLD-MCNC: 12.3 G/DL (ref 12–16)
IMM GRANULOCYTES # BLD AUTO: 0.03 X10*3/UL (ref 0–0.5)
IMM GRANULOCYTES NFR BLD AUTO: 0.4 % (ref 0–0.9)
LDLC SERPL CALC-MCNC: 131 MG/DL
LYMPHOCYTES # BLD AUTO: 1.8 X10*3/UL (ref 0.8–3)
LYMPHOCYTES NFR BLD AUTO: 22.1 %
MCH RBC QN AUTO: 28.5 PG (ref 26–34)
MCHC RBC AUTO-ENTMCNC: 33.8 G/DL (ref 32–36)
MCV RBC AUTO: 84 FL (ref 80–100)
MONOCYTES # BLD AUTO: 0.53 X10*3/UL (ref 0.05–0.8)
MONOCYTES NFR BLD AUTO: 6.5 %
NEUTROPHILS # BLD AUTO: 5.64 X10*3/UL (ref 1.6–5.5)
NEUTROPHILS NFR BLD AUTO: 69.4 %
NON HDL CHOLESTEROL: 162 MG/DL (ref 0–149)
NRBC BLD-RTO: 0 /100 WBCS (ref 0–0)
PLATELET # BLD AUTO: 192 X10*3/UL (ref 150–450)
POTASSIUM SERPL-SCNC: 4 MMOL/L (ref 3.5–5.3)
PROT SERPL-MCNC: 7.2 G/DL (ref 6.4–8.2)
RBC # BLD AUTO: 4.32 X10*6/UL (ref 4–5.2)
SODIUM SERPL-SCNC: 140 MMOL/L (ref 136–145)
TRIGL SERPL-MCNC: 156 MG/DL (ref 0–149)
TSH SERPL-ACNC: 0.9 MIU/L (ref 0.44–3.98)
VLDL: 31 MG/DL (ref 0–40)
WBC # BLD AUTO: 8.1 X10*3/UL (ref 4.4–11.3)

## 2024-10-07 PROCEDURE — 36415 COLL VENOUS BLD VENIPUNCTURE: CPT

## 2024-10-07 PROCEDURE — 84443 ASSAY THYROID STIM HORMONE: CPT

## 2024-10-07 PROCEDURE — 80061 LIPID PANEL: CPT

## 2024-10-07 PROCEDURE — 82306 VITAMIN D 25 HYDROXY: CPT

## 2024-10-07 PROCEDURE — 83036 HEMOGLOBIN GLYCOSYLATED A1C: CPT

## 2024-10-07 PROCEDURE — 85025 COMPLETE CBC W/AUTO DIFF WBC: CPT

## 2024-10-07 PROCEDURE — 80053 COMPREHEN METABOLIC PANEL: CPT

## 2024-10-10 NOTE — PROGRESS NOTES
Subjective   Patient ID: Obdulia Washington is a 74 y.o. female who presents for Medicare Annual Wellness Visit Subsequent ([facility], discuss vitamin D).    HPI     Hyperlipidemia: The patient is presenting today for a follow up of hyperlipidemia. The patient has not been hospitalized for this in the last 6 months. The patient is compliant with medications. Patient denies any side effects to the medications.     Hypertension: The patient is here for an evaluation of elevated blood pressure. The patient is trying to follow a low-salt diet. She is adherent to a low salt diet. Blood pressure is well controlled at home. The patient has not been hospitalized for this in the last 6 months. The patient is compliant with medications. Patient denies any side effects to the medications.     Anxiety:  The patient is presenting today for a follow up of anxiety disorder. Symptoms have been remained the same since that time. She denies having any current suicidal and/or homicidal ideation.  The patient has not been hospitalized for this in the last 6 months.  Patient denies any side effects to the medications.     Back Pain: Patient presents for follow up of low back problems. Symptoms have been present for several years and include pain in the lower back (aching and sharp in character; 8/10 in severity).  Treatments so far initiated by patient: physiotherapy. Previous lower back problems: none. Previous workup: MRI 09/19/2023. The patient went to physiotherapy twice a week from November 2023 to March 2024. She was with a PT who was a travel PT and had left in the middle of her progress and ended up with a new PT. She previously mentioned that she does not see an progress or difference in her back pain with the new PT.    The patient denies any changes in vision, hearing or dental.     The patient maintains they do not have any chest pain, chest tightness or shortness of breath.    They do not experience nausea, emesis, changes in  "bowel movements or dyspepsia.    The patient denies nocturia.     The patient's colonoscopy is up to date.    The patient's mammogram is up to date.    The patient's vaccinations are up to date.      Review of Systems   All other systems reviewed and are negative.      Objective   /66 (BP Location: Right arm, Patient Position: Sitting, BP Cuff Size: Large adult)   Pulse 75   Temp 36.4 °C (97.6 °F) (Temporal)   Ht 1.6 m (5' 3\")   Wt 79.2 kg (174 lb 11.2 oz)   SpO2 96%   BMI 30.95 kg/m²     Physical Exam  Vitals reviewed.   HENT:      Right Ear: Tympanic membrane and ear canal normal.      Left Ear: Tympanic membrane and ear canal normal.      Nose: Nose normal.      Mouth/Throat:      Pharynx: Oropharynx is clear.   Eyes:      Pupils: Pupils are equal, round, and reactive to light.   Cardiovascular:      Rate and Rhythm: Normal rate and regular rhythm.      Heart sounds: Normal heart sounds.   Pulmonary:      Effort: Pulmonary effort is normal.      Breath sounds: Normal breath sounds.   Abdominal:      General: Abdomen is flat. Bowel sounds are normal.      Palpations: Abdomen is soft.   Musculoskeletal:         General: Normal range of motion.      Cervical back: Normal range of motion and neck supple.   Skin:     General: Skin is warm and dry.   Neurological:      General: No focal deficit present.      Mental Status: She is alert.   Psychiatric:         Mood and Affect: Mood normal.         Assessment/Plan   Diagnoses and all orders for this visit:  Medicare annual wellness visit, subsequent  -     Follow Up In [facility] - PCP - Medicare Annual  Benign essential HTN  -     amLODIPine (Norvasc) 2.5 mg tablet; Take 1 tablet (2.5 mg) by mouth 2 times a day.  -     Follow Up In [facility] - PCP - Health Maintenance; Future  Hyperlipidemia, unspecified hyperlipidemia type  Situational anxiety  Chronic bilateral low back pain without sciatica  Hypertension, unspecified type  -     " chlorthalidone (Hygroton) 25 mg tablet; Take 1 tablet (25 mg) by mouth once daily.  -     losartan (Cozaar) 50 mg tablet; Take 1 tablet (50 mg) by mouth 2 times a day.  Other specified peripheral vascular diseases  Elevated fasting blood sugar  Obesity with body mass index 30 or greater  Malignant neoplasm of left female breast, unspecified estrogen receptor status, unspecified site of breast  Infiltrating ductal carcinoma of left breast (Multi)  Other orders  -     Flu vaccine, trivalent, preservative free, HIGH-DOSE, age 65y+ (Fluzone)    1. Medicare annual wellness visit, subsequent  Follow Up In [facility] - PCP - Medicare Annual      2. Benign essential HTN  amLODIPine (Norvasc) 2.5 mg tablet    Follow Up In [facility] - PCP - Health Maintenance      3. Hyperlipidemia, unspecified hyperlipidemia type        4. Situational anxiety        5. Chronic bilateral low back pain without sciatica        6. Hypertension, unspecified type  chlorthalidone (Hygroton) 25 mg tablet    losartan (Cozaar) 50 mg tablet      7. Other specified peripheral vascular diseases        8. Elevated fasting blood sugar        9. Obesity with body mass index 30 or greater        10. Malignant neoplasm of left female breast, unspecified estrogen receptor status, unspecified site of breast        11. Infiltrating ductal carcinoma of left breast (Multi)           #1 Medicare wellness visit    Today in the office you had your annual Medicare wellness visit    Please keep your appointment as I know you will to see your gastroenterologist for colon cancer screening colonoscopy    Continue following up with your breast specialist in regards to your mammograms I am happy to report that has been 5 years since your diagnosis and I do agree that it is safe for you to stop the Anastos all at this time    Your blood pressure is normal today please stay on all blood pressure medications which are helping lower the blood pressure and  prevent heart attack and stroke.    We did review all of your recent labs your blood sugar remains slightly elevated indicating you have prediabetes but you do not have diabetes.  Will continue to monitor every 6 months.  Continue eating a heart healthy diet a good goal 5-7 servings of fresh fruit and vegetable daily along with lean proteins avoiding the simple sugars avoiding the fast foods and avoiding the breads and cereals.  In addition and equally as important walking exercising being active 30 minutes 5 days a week is ideal certainly would contact me or the cardiologist if you had any chest pain chest pressure or chest tightness with your activities    You are up-to-date with all vaccines you received your flu shot today I would wait 2 weeks to get your COVID booster.  You have the RSV vaccine and you have had both of the pneumonia vaccines and the shingles vaccines.    Refills of your medicines were sent to the local pharmacy    If you otherwise stay healthy I will see you back when you return from [place] in May 2025 please have repeat labs prior to that appointment as well          Scribe Attestation  By signing my name below, I, Sneha Dove, Joshibe   attest that this documentation has been prepared under the direction and in the presence of Johnny Arora MD.    This note has been transcribed using a medical scribe and there is a possibility of unintentional typing misprints.

## 2024-10-11 ENCOUNTER — APPOINTMENT (OUTPATIENT)
Dept: PRIMARY CARE | Facility: CLINIC | Age: 74
End: 2024-10-11
Payer: MEDICARE

## 2024-10-11 VITALS
OXYGEN SATURATION: 96 % | WEIGHT: 174.7 LBS | TEMPERATURE: 97.6 F | SYSTOLIC BLOOD PRESSURE: 110 MMHG | DIASTOLIC BLOOD PRESSURE: 66 MMHG | BODY MASS INDEX: 30.95 KG/M2 | HEIGHT: 63 IN | HEART RATE: 75 BPM

## 2024-10-11 DIAGNOSIS — M54.50 CHRONIC BILATERAL LOW BACK PAIN WITHOUT SCIATICA: ICD-10-CM

## 2024-10-11 DIAGNOSIS — F41.8 SITUATIONAL ANXIETY: ICD-10-CM

## 2024-10-11 DIAGNOSIS — C50.912 INFILTRATING DUCTAL CARCINOMA OF LEFT BREAST (MULTI): ICD-10-CM

## 2024-10-11 DIAGNOSIS — E78.5 HYPERLIPIDEMIA, UNSPECIFIED HYPERLIPIDEMIA TYPE: ICD-10-CM

## 2024-10-11 DIAGNOSIS — C50.912 MALIGNANT NEOPLASM OF LEFT FEMALE BREAST, UNSPECIFIED ESTROGEN RECEPTOR STATUS, UNSPECIFIED SITE OF BREAST: ICD-10-CM

## 2024-10-11 DIAGNOSIS — E66.9 OBESITY WITH BODY MASS INDEX 30 OR GREATER: ICD-10-CM

## 2024-10-11 DIAGNOSIS — I10 HYPERTENSION, UNSPECIFIED TYPE: ICD-10-CM

## 2024-10-11 DIAGNOSIS — R73.01 ELEVATED FASTING BLOOD SUGAR: ICD-10-CM

## 2024-10-11 DIAGNOSIS — G89.29 CHRONIC BILATERAL LOW BACK PAIN WITHOUT SCIATICA: ICD-10-CM

## 2024-10-11 DIAGNOSIS — I10 BENIGN ESSENTIAL HTN: ICD-10-CM

## 2024-10-11 DIAGNOSIS — I73.89 OTHER SPECIFIED PERIPHERAL VASCULAR DISEASES: ICD-10-CM

## 2024-10-11 DIAGNOSIS — Z00.00 MEDICARE ANNUAL WELLNESS VISIT, SUBSEQUENT: Primary | ICD-10-CM

## 2024-10-11 PROCEDURE — G0008 ADMIN INFLUENZA VIRUS VAC: HCPCS | Performed by: FAMILY MEDICINE

## 2024-10-11 PROCEDURE — 1160F RVW MEDS BY RX/DR IN RCRD: CPT | Performed by: FAMILY MEDICINE

## 2024-10-11 PROCEDURE — 1170F FXNL STATUS ASSESSED: CPT | Performed by: FAMILY MEDICINE

## 2024-10-11 PROCEDURE — 3074F SYST BP LT 130 MM HG: CPT | Performed by: FAMILY MEDICINE

## 2024-10-11 PROCEDURE — 99214 OFFICE O/P EST MOD 30 MIN: CPT | Performed by: FAMILY MEDICINE

## 2024-10-11 PROCEDURE — 3078F DIAST BP <80 MM HG: CPT | Performed by: FAMILY MEDICINE

## 2024-10-11 PROCEDURE — 90662 IIV NO PRSV INCREASED AG IM: CPT | Performed by: FAMILY MEDICINE

## 2024-10-11 PROCEDURE — 1159F MED LIST DOCD IN RCRD: CPT | Performed by: FAMILY MEDICINE

## 2024-10-11 PROCEDURE — 1123F ACP DISCUSS/DSCN MKR DOCD: CPT | Performed by: FAMILY MEDICINE

## 2024-10-11 PROCEDURE — G0439 PPPS, SUBSEQ VISIT: HCPCS | Performed by: FAMILY MEDICINE

## 2024-10-11 RX ORDER — LOSARTAN POTASSIUM 50 MG/1
50 TABLET ORAL 2 TIMES DAILY
Qty: 180 TABLET | Refills: 3 | Status: SHIPPED | OUTPATIENT
Start: 2024-10-11 | End: 2025-10-11

## 2024-10-11 RX ORDER — CHLORTHALIDONE 25 MG/1
25 TABLET ORAL DAILY
Qty: 90 TABLET | Refills: 3 | Status: SHIPPED | OUTPATIENT
Start: 2024-10-11

## 2024-10-11 RX ORDER — METRONIDAZOLE 7.5 MG/G
LOTION TOPICAL
COMMUNITY
Start: 2024-07-22

## 2024-10-11 RX ORDER — AMLODIPINE BESYLATE 2.5 MG/1
2.5 TABLET ORAL 2 TIMES DAILY
Qty: 180 TABLET | Refills: 3 | Status: SHIPPED | OUTPATIENT
Start: 2024-10-11 | End: 2025-10-11

## 2024-10-11 ASSESSMENT — ACTIVITIES OF DAILY LIVING (ADL)
BATHING: INDEPENDENT
MANAGING_FINANCES: INDEPENDENT
GROCERY_SHOPPING: INDEPENDENT
DRESSING: INDEPENDENT
TAKING_MEDICATION: INDEPENDENT
DOING_HOUSEWORK: INDEPENDENT

## 2024-10-11 ASSESSMENT — PATIENT HEALTH QUESTIONNAIRE - PHQ9
1. LITTLE INTEREST OR PLEASURE IN DOING THINGS: NOT AT ALL
2. FEELING DOWN, DEPRESSED OR HOPELESS: NOT AT ALL
SUM OF ALL RESPONSES TO PHQ9 QUESTIONS 1 AND 2: 0

## 2024-10-11 NOTE — PATIENT INSTRUCTIONS
#1 Medicare wellness visit    Today in the office you had your annual Medicare wellness visit    Please keep your appointment as I know you will to see your gastroenterologist for colon cancer screening colonoscopy    Continue following up with your breast specialist in regards to your mammograms I am happy to report that has been 5 years since your diagnosis and I do agree that it is safe for you to stop the Anastos all at this time    Your blood pressure is normal today please stay on all blood pressure medications which are helping lower the blood pressure and prevent heart attack and stroke.    We did review all of your recent labs your blood sugar remains slightly elevated indicating you have prediabetes but you do not have diabetes.  Will continue to monitor every 6 months.  Continue eating a heart healthy diet a good goal 5-7 servings of fresh fruit and vegetable daily along with lean proteins avoiding the simple sugars avoiding the fast foods and avoiding the breads and cereals.  In addition and equally as important walking exercising being active 30 minutes 5 days a week is ideal certainly would contact me or the cardiologist if you had any chest pain chest pressure or chest tightness with your activities    You are up-to-date with all vaccines you received your flu shot today I would wait 2 weeks to get your COVID booster.  You have the RSV vaccine and you have had both of the pneumonia vaccines and the shingles vaccines.    Refills of your medicines were sent to the local pharmacy    If you otherwise stay healthy I will see you back when you return from [place] in May 2025 please have repeat labs prior to that appointment as well

## 2024-10-15 PROBLEM — K63.5 POLYP OF COLON: Status: ACTIVE | Noted: 2024-10-15

## 2024-10-15 PROBLEM — K62.1 RECTAL POLYP: Status: ACTIVE | Noted: 2024-10-15

## 2024-10-15 PROBLEM — K57.30 DIVERTICULOSIS OF LARGE INTESTINE WITHOUT PERFORATION OR ABS: Status: ACTIVE | Noted: 2024-10-15

## 2024-10-24 ENCOUNTER — TELEPHONE (OUTPATIENT)
Dept: PRIMARY CARE | Facility: CLINIC | Age: 74
End: 2024-10-24
Payer: MEDICARE

## 2024-10-24 DIAGNOSIS — E55.9 VITAMIN D DEFICIENCY: ICD-10-CM

## 2024-10-24 RX ORDER — ASPIRIN 325 MG
50000 TABLET, DELAYED RELEASE (ENTERIC COATED) ORAL
Qty: 4 CAPSULE | Refills: 0 | Status: SHIPPED | OUTPATIENT
Start: 2024-10-27 | End: 2024-11-26

## 2025-04-21 ENCOUNTER — TELEPHONE (OUTPATIENT)
Dept: PRIMARY CARE | Facility: CLINIC | Age: 75
End: 2025-04-21
Payer: MEDICARE

## 2025-04-21 NOTE — TELEPHONE ENCOUNTER
Patient is requesting her prescription to be filled of the following  cholecalciferol (Vitamin D-3) 1,250 mcg (50,000 unit) capsule to [facility] Phone: [phone]     Patient is OUT! She has called in several time but hasn't hear back from us.     Appt is 05/20/25.

## 2025-04-22 DIAGNOSIS — E55.9 VITAMIN D DEFICIENCY: Primary | ICD-10-CM

## 2025-04-22 RX ORDER — ERGOCALCIFEROL 1.25 MG/1
1.25 CAPSULE ORAL WEEKLY
Qty: 8 CAPSULE | Refills: 0 | Status: SHIPPED | OUTPATIENT
Start: 2025-04-22 | End: 2025-04-22 | Stop reason: SDUPTHER

## 2025-04-22 RX ORDER — ERGOCALCIFEROL 1.25 MG/1
1.25 CAPSULE ORAL WEEKLY
Qty: 8 CAPSULE | Refills: 0 | Status: SHIPPED | OUTPATIENT
Start: 2025-04-22 | End: 2025-06-21

## 2025-05-20 ENCOUNTER — APPOINTMENT (OUTPATIENT)
Dept: PRIMARY CARE | Facility: CLINIC | Age: 75
End: 2025-05-20
Payer: MEDICARE

## 2025-05-20 VITALS
OXYGEN SATURATION: 97 % | WEIGHT: 176 LBS | SYSTOLIC BLOOD PRESSURE: 111 MMHG | TEMPERATURE: 97.8 F | DIASTOLIC BLOOD PRESSURE: 68 MMHG | HEART RATE: 75 BPM | BODY MASS INDEX: 31.18 KG/M2

## 2025-05-20 DIAGNOSIS — M54.50 CHRONIC BILATERAL LOW BACK PAIN WITHOUT SCIATICA: ICD-10-CM

## 2025-05-20 DIAGNOSIS — T75.3XXA MOTION SICKNESS, INITIAL ENCOUNTER: ICD-10-CM

## 2025-05-20 DIAGNOSIS — R53.83 OTHER FATIGUE: ICD-10-CM

## 2025-05-20 DIAGNOSIS — M54.41 CHRONIC BILATERAL LOW BACK PAIN WITH RIGHT-SIDED SCIATICA: ICD-10-CM

## 2025-05-20 DIAGNOSIS — E53.9 VITAMIN B DEFICIENCY: ICD-10-CM

## 2025-05-20 DIAGNOSIS — G89.29 CHRONIC BILATERAL LOW BACK PAIN WITH RIGHT-SIDED SCIATICA: ICD-10-CM

## 2025-05-20 DIAGNOSIS — I1A.0 RESISTANT HYPERTENSION: ICD-10-CM

## 2025-05-20 DIAGNOSIS — H91.90 HEARING LOSS, UNSPECIFIED HEARING LOSS TYPE, UNSPECIFIED LATERALITY: ICD-10-CM

## 2025-05-20 DIAGNOSIS — E66.9 OBESITY WITH BODY MASS INDEX 30 OR GREATER: ICD-10-CM

## 2025-05-20 DIAGNOSIS — I10 BENIGN ESSENTIAL HTN: ICD-10-CM

## 2025-05-20 DIAGNOSIS — R73.9 ELEVATED BLOOD SUGAR LEVEL: ICD-10-CM

## 2025-05-20 DIAGNOSIS — M54.41 ACUTE RIGHT-SIDED LOW BACK PAIN WITH RIGHT-SIDED SCIATICA: ICD-10-CM

## 2025-05-20 DIAGNOSIS — Z78.0 POST-MENOPAUSAL: ICD-10-CM

## 2025-05-20 DIAGNOSIS — E55.9 VITAMIN D DEFICIENCY: ICD-10-CM

## 2025-05-20 DIAGNOSIS — R73.03 PRE-DIABETES: Primary | ICD-10-CM

## 2025-05-20 DIAGNOSIS — E78.5 HYPERLIPIDEMIA, UNSPECIFIED HYPERLIPIDEMIA TYPE: ICD-10-CM

## 2025-05-20 DIAGNOSIS — E78.2 MIXED HYPERLIPIDEMIA: ICD-10-CM

## 2025-05-20 DIAGNOSIS — Z13.6 SCREENING FOR CARDIOVASCULAR CONDITION: ICD-10-CM

## 2025-05-20 DIAGNOSIS — C50.919 MALIGNANT NEOPLASM OF FEMALE BREAST, UNSPECIFIED ESTROGEN RECEPTOR STATUS, UNSPECIFIED LATERALITY, UNSPECIFIED SITE OF BREAST: ICD-10-CM

## 2025-05-20 DIAGNOSIS — G89.29 CHRONIC BILATERAL LOW BACK PAIN WITHOUT SCIATICA: ICD-10-CM

## 2025-05-20 LAB
25(OH)D3+25(OH)D2 SERPL-MCNC: 60 NG/ML (ref 30–100)
ALBUMIN SERPL-MCNC: 4.3 G/DL (ref 3.6–5.1)
ALP SERPL-CCNC: 29 U/L (ref 37–153)
ALT SERPL-CCNC: 26 U/L (ref 6–29)
ANION GAP SERPL CALCULATED.4IONS-SCNC: 12 MMOL/L (CALC) (ref 7–17)
AST SERPL-CCNC: 22 U/L (ref 10–35)
BASOPHILS # BLD AUTO: 48 CELLS/UL (ref 0–200)
BASOPHILS NFR BLD AUTO: 0.8 %
BILIRUB SERPL-MCNC: 0.4 MG/DL (ref 0.2–1.2)
BUN SERPL-MCNC: 18 MG/DL (ref 7–25)
CALCIUM SERPL-MCNC: 9.5 MG/DL (ref 8.6–10.4)
CHLORIDE SERPL-SCNC: 98 MMOL/L (ref 98–110)
CHOLEST SERPL-MCNC: 237 MG/DL
CHOLEST/HDLC SERPL: 3.3 (CALC)
CO2 SERPL-SCNC: 29 MMOL/L (ref 20–32)
CREAT SERPL-MCNC: 0.89 MG/DL (ref 0.6–1)
EGFRCR SERPLBLD CKD-EPI 2021: 67 ML/MIN/1.73M2
EOSINOPHIL # BLD AUTO: 150 CELLS/UL (ref 15–500)
EOSINOPHIL NFR BLD AUTO: 2.5 %
ERYTHROCYTE [DISTWIDTH] IN BLOOD BY AUTOMATED COUNT: 12.8 % (ref 11–15)
EST. AVERAGE GLUCOSE BLD GHB EST-MCNC: 128 MG/DL
EST. AVERAGE GLUCOSE BLD GHB EST-SCNC: 7.1 MMOL/L
GLUCOSE SERPL-MCNC: 108 MG/DL (ref 65–99)
HBA1C MFR BLD: 6.1 %
HCT VFR BLD AUTO: 37.3 % (ref 35–45)
HDLC SERPL-MCNC: 71 MG/DL
HGB BLD-MCNC: 12.2 G/DL (ref 11.7–15.5)
LDLC SERPL CALC-MCNC: 139 MG/DL (CALC)
LYMPHOCYTES # BLD AUTO: 1872 CELLS/UL (ref 850–3900)
LYMPHOCYTES NFR BLD AUTO: 31.2 %
MCH RBC QN AUTO: 28.8 PG (ref 27–33)
MCHC RBC AUTO-ENTMCNC: 32.7 G/DL (ref 32–36)
MCV RBC AUTO: 88.2 FL (ref 80–100)
MONOCYTES # BLD AUTO: 402 CELLS/UL (ref 200–950)
MONOCYTES NFR BLD AUTO: 6.7 %
NEUTROPHILS # BLD AUTO: 3528 CELLS/UL (ref 1500–7800)
NEUTROPHILS NFR BLD AUTO: 58.8 %
NONHDLC SERPL-MCNC: 166 MG/DL (CALC)
PLATELET # BLD AUTO: 246 THOUSAND/UL (ref 140–400)
PMV BLD REES-ECKER: 11.3 FL (ref 7.5–12.5)
POTASSIUM SERPL-SCNC: 3.7 MMOL/L (ref 3.5–5.3)
PROT SERPL-MCNC: 7 G/DL (ref 6.1–8.1)
RBC # BLD AUTO: 4.23 MILLION/UL (ref 3.8–5.1)
SODIUM SERPL-SCNC: 139 MMOL/L (ref 135–146)
TRIGL SERPL-MCNC: 143 MG/DL
TSH SERPL-ACNC: 0.88 MIU/L (ref 0.4–4.5)
WBC # BLD AUTO: 6 THOUSAND/UL (ref 3.8–10.8)

## 2025-05-20 PROCEDURE — 1159F MED LIST DOCD IN RCRD: CPT | Performed by: FAMILY MEDICINE

## 2025-05-20 PROCEDURE — 1160F RVW MEDS BY RX/DR IN RCRD: CPT | Performed by: FAMILY MEDICINE

## 2025-05-20 PROCEDURE — 3074F SYST BP LT 130 MM HG: CPT | Performed by: FAMILY MEDICINE

## 2025-05-20 PROCEDURE — 3078F DIAST BP <80 MM HG: CPT | Performed by: FAMILY MEDICINE

## 2025-05-20 PROCEDURE — G2211 COMPLEX E/M VISIT ADD ON: HCPCS | Performed by: FAMILY MEDICINE

## 2025-05-20 PROCEDURE — 1123F ACP DISCUSS/DSCN MKR DOCD: CPT | Performed by: FAMILY MEDICINE

## 2025-05-20 PROCEDURE — 1036F TOBACCO NON-USER: CPT | Performed by: FAMILY MEDICINE

## 2025-05-20 PROCEDURE — 99214 OFFICE O/P EST MOD 30 MIN: CPT | Performed by: FAMILY MEDICINE

## 2025-05-20 RX ORDER — METRONIDAZOLE 10 MG/G
GEL TOPICAL
COMMUNITY

## 2025-05-20 RX ORDER — PREDNISONE 10 MG/1
TABLET ORAL
COMMUNITY
End: 2025-05-20 | Stop reason: WASHOUT

## 2025-05-20 RX ORDER — ERGOCALCIFEROL 1.25 MG/1
1.25 CAPSULE ORAL WEEKLY
Qty: 12 CAPSULE | Refills: 3 | Status: SHIPPED | OUTPATIENT
Start: 2025-05-20 | End: 2026-05-20

## 2025-05-20 RX ORDER — ONDANSETRON 4 MG/1
4 TABLET, FILM COATED ORAL EVERY 8 HOURS PRN
Qty: 20 TABLET | Refills: 0 | Status: SHIPPED | OUTPATIENT
Start: 2025-05-20 | End: 2025-05-27

## 2025-05-20 RX ORDER — TIZANIDINE 4 MG/1
TABLET ORAL
COMMUNITY
End: 2025-05-20 | Stop reason: WASHOUT

## 2025-05-20 RX ORDER — MECLIZINE HYDROCHLORIDE 25 MG/1
25 TABLET ORAL 3 TIMES DAILY PRN
Qty: 30 TABLET | Refills: 0 | Status: SHIPPED | OUTPATIENT
Start: 2025-05-20 | End: 2025-06-19

## 2025-05-20 NOTE — PROGRESS NOTES
Subjective   Patient ID: Obdulia Washington is a 74 y.o. female who presents for Follow-up (6mo fuv htn, chol, need bone density, calcium tests? Sea sick med, back is still the same, discuss blood work).  History of Present Illness  Obdulia Washington is a 74 year old female who presents for a routine follow-up and evaluation of back pain and weight management.    She experiences persistent back pain that has not improved. The pain is constant and worsens with activities such as folding clothes, emptying the , and other household chores. Relief is achieved by pacing herself and taking breaks, with sitting for five to ten minutes alleviating the discomfort. Recently, the pain has moved towards her shoulder blades and sometimes causes cramping. She has previously been on steroids, tizanidine, and prednisone for the pain, and underwent an MRI in September 2023.    She is frustrated with her weight, which has not decreased since discontinuing anastrozole in September. Her weight has increased by two pounds, and she is concerned about her hemoglobin A1c, which has risen from 5.8 to 6.1 over the past year. She avoids sodas, juices, and desserts and has reduced her intake of white bread, pasta, and rice, opting for whole grain alternatives.    No chest pain, chest tightness, or shortness of breath during physical activities such as walking or biking. She is more active at home compared to when she is in [place], where she avoids yard work due to the presence of snakes.    Her current medications include amlodipine 2.5 mg twice a day, losartan 50 mg twice a day, chlorthalidone 25 mg once a day, and omeprazole for acid indigestion. She also takes vitamin D once a week.    Review of Systems   All other systems reviewed and are negative.    Results  LABS  Hemoglobin A1c: 6.1%  Vitamin D: 60 ng/mL  TSH: within normal limits  Hemoglobin: within normal limits  Platelets: within normal limits  White blood cells:  within normal limits  Blood glucose: 108 mg/dL  Total cholesterol: 237 mg/dL  LDL cholesterol: 139 mg/dL    RADIOLOGY  Lumbar spine MRI: Arthritis, no spinal tumor, no cord compression (09/2023)    Objective     /68 (BP Location: Right arm, Patient Position: Sitting, BP Cuff Size: Large adult)   Pulse 75   Temp 36.6 °C (97.8 °F) (Temporal)   Wt 79.8 kg (176 lb)   SpO2 97%   BMI 31.18 kg/m²      Physical Exam  VITALS: BP- 111/68  GENERAL: Alert, cooperative, well developed, no acute distress.  HEENT: Normocephalic, normal oropharynx, moist mucous membranes, external auditory canals clear bilaterally.  CHEST: Clear to auscultation bilaterally, no wheezes, rhonchi, or crackles.  CARDIOVASCULAR: Normal heart rate and rhythm, S1 and S2 normal without murmurs, peripheral pulses intact.  ABDOMEN: Soft, mild tenderness with palpation in the lumbar region, non-distended, without organomegaly, normal bowel sounds.  EXTREMITIES: No cyanosis or edema.  NEUROLOGICAL: Cranial nerves grossly intact, moves all extremities without gross motor or sensory deficit.    Assessment & Plan  Chronic Back Pain  Persistent back pain exacerbated by physical activities such as folding clothes and emptying the , relieved by rest. Previous MRI in September 2023 showed arthritis without spinal tumor or cord compression. She has tried dry needling with some relief and is considering acupuncture as a potential treatment option. Referral to [facility] for acupuncture was discussed, which has been beneficial for some patients.  - Refer to [facility] for acupuncture evaluation    Elevated Hemoglobin A1c  Hemoglobin A1c increased to 6.1 from 5.8 last year, indicating progression towards type 2 diabetes. Emphasized dietary modifications and lifestyle changes over medication due to potential side effects. She is not currently experiencing any symptoms of diabetes.  - Refer to nutritionist for dietary counseling to  prevent diabetes  - Repeat blood work in six months    Hyperlipidemia  Total cholesterol increased to 237 mg/dL from 226 mg/dL, and LDL cholesterol is 139 mg/dL. Discussed the importance of dietary modifications to manage cholesterol levels.    Hypertension  Blood pressure is well-controlled at 111/68 mmHg on amlodipine, losartan, and chlorthalidone with no reported side effects.    Gastroesophageal Reflux Disease (GERD)  GERD is managed with omeprazole. No new symptoms reported.    General Health Maintenance  She is due for a bone density test and a coronary calcium score. Discussed the importance of these screenings in monitoring bone health and cardiovascular risk.  - Order bone density test  - Order CT scan for coronary calcium score    Hearing Evaluation  She reports no issues with hearing but will undergo evaluation due to spouse's concerns. Referral to audiology for a hearing test was discussed.  - Refer to audiology for hearing test    1. Pre-diabetes  Referral to Nutrition Services      2. Benign essential HTN  Follow Up In [facility] - PCP - Health Maintenance      3. Mixed hyperlipidemia  CT cardiac scoring wo IV contrast      4. Post-menopausal  XR DEXA bone density      5. Screening for cardiovascular condition  CT cardiac scoring wo IV contrast      6. Vitamin D deficiency  ergocalciferol (Vitamin D-2) 1250 mcg (50,000 units) capsule    Vitamin D 25-Hydroxy,Total (for eval of Vitamin D levels)    Vitamin D 25-Hydroxy,Total (for eval of Vitamin D levels)      7. Chronic bilateral low back pain without sciatica  Referral to [facility]      8. Chronic bilateral low back pain with right-sided sciatica  Referral to [facility]      9. Acute right-sided low back pain with right-sided sciatica  Referral to [facility]      10. Hearing loss, unspecified hearing loss type, unspecified laterality  Referral to Audiology      11. Hyperlipidemia, unspecified hyperlipidemia type   CBC and Auto Differential    Comprehensive Metabolic Panel    Lipid Panel    CBC and Auto Differential    Comprehensive Metabolic Panel    Lipid Panel      12. Other fatigue  TSH with reflex to Free T4 if abnormal    TSH with reflex to Free T4 if abnormal      13. Elevated blood sugar level  Hemoglobin A1C    Hemoglobin A1C      14. Motion sickness, initial encounter  ondansetron (Zofran) 4 mg tablet    meclizine (Antivert) 25 mg tablet      15. Resistant hypertension        16. Obesity with body mass index 30 or greater        17. Vitamin B deficiency           Johnny Arora MD     This medical note was created with the assistance of artificial intelligence (AI) for documentation purposes. The content has been reviewed and confirmed by the healthcare provider for accuracy and completeness. Patient consented to the use of audio recording and use of AI during their visit.

## 2025-05-20 NOTE — PATIENT INSTRUCTIONS
VISIT SUMMARY:  Today, we discussed your persistent back pain, weight management, and overall health. We reviewed your current medications and recent lab results, and we talked about some new steps to help manage your health concerns.    YOUR PLAN:  -CHRONIC BACK PAIN: Your back pain is persistent and worsens with activities like folding clothes and emptying the . An MRI showed arthritis but no spinal tumor or cord compression. We discussed trying acupuncture as a potential treatment option, and I will refer you to [facility] for an evaluation.    -ELEVATED HEMOGLOBIN A1C: Your hemoglobin A1c has increased to 6.1, indicating you are progressing towards type 2 diabetes. We emphasized the importance of dietary changes and lifestyle modifications to manage this. I will refer you to a nutritionist for dietary counseling, and we will repeat your blood work in six months.    -HYPERLIPIDEMIA: Your total cholesterol and LDL cholesterol levels have increased. We discussed the importance of dietary modifications to help manage your cholesterol levels.    -HYPERTENSION: Your blood pressure is well-controlled with your current medications, and you are not experiencing any side effects.    -GASTROESOPHAGEAL REFLUX DISEASE (GERD): Your GERD is being managed with omeprazole, and you have not reported any new symptoms.    -GENERAL HEALTH MAINTENANCE: You are due for a bone density test and a coronary calcium score to monitor your bone health and cardiovascular risk. These screenings are important for your overall health.    -HEARING EVALUATION: Although you have not reported any hearing issues, we discussed a referral to audiology for a hearing test due to your spouse's concerns.    INSTRUCTIONS:  1. Schedule an appointment with [facility] for an acupuncture evaluation.  2. Schedule an appointment with a nutritionist for dietary counseling.  3. Repeat blood work in six months to monitor your hemoglobin  A1c levels.  4. Schedule a bone density test and a CT scan for a coronary calcium score.  5. Schedule an appointment with audiology for a hearing test.

## 2025-05-22 ENCOUNTER — HOSPITAL ENCOUNTER (OUTPATIENT)
Dept: RADIOLOGY | Facility: CLINIC | Age: 75
Discharge: HOME | End: 2025-05-22
Payer: MEDICARE

## 2025-05-22 DIAGNOSIS — Z78.0 POST-MENOPAUSAL: ICD-10-CM

## 2025-05-22 PROCEDURE — 77080 DXA BONE DENSITY AXIAL: CPT | Performed by: STUDENT IN AN ORGANIZED HEALTH CARE EDUCATION/TRAINING PROGRAM

## 2025-05-22 PROCEDURE — 77080 DXA BONE DENSITY AXIAL: CPT

## 2025-05-23 ENCOUNTER — APPOINTMENT (OUTPATIENT)
Dept: PRIMARY CARE | Facility: CLINIC | Age: 75
End: 2025-05-23
Payer: MEDICARE

## 2025-05-28 SDOH — ECONOMIC STABILITY: FOOD INSECURITY: WITHIN THE PAST 12 MONTHS, THE FOOD YOU BOUGHT JUST DIDN'T LAST AND YOU DIDN'T HAVE MONEY TO GET MORE.: NEVER TRUE

## 2025-05-28 SDOH — SOCIAL STABILITY: SOCIAL NETWORK

## 2025-05-28 SDOH — SOCIAL STABILITY: SOCIAL NETWORK: I HAVE TROUBLE DOING ALL OF MY USUAL WORK (INCLUDE WORK AT HOME): USUALLY

## 2025-05-28 SDOH — SOCIAL STABILITY: SOCIAL NETWORK: I HAVE TROUBLE DOING ALL OF MY REGULAR LEISURE ACTIVITIES WITH OTHERS: SOMETIMES

## 2025-05-28 SDOH — ECONOMIC STABILITY: FOOD INSECURITY: WITHIN THE PAST 12 MONTHS, YOU WORRIED THAT YOUR FOOD WOULD RUN OUT BEFORE YOU GOT MONEY TO BUY MORE.: NEVER TRUE

## 2025-05-28 SDOH — SOCIAL STABILITY: SOCIAL NETWORK: I HAVE TROUBLE DOING ALL OF THE ACTIVITIES WITH FRIENDS THAT I WANT TO DO: SOMETIMES

## 2025-05-28 SDOH — SOCIAL STABILITY: SOCIAL NETWORK: PROMIS ABILITY TO PARTICIPATE IN SOCIAL ROLES & ACTIVITIES T-SCORE: 43

## 2025-05-28 SDOH — SOCIAL STABILITY: SOCIAL NETWORK: I HAVE TROUBLE DOING ALL OF THE FAMILY ACTIVITIES THAT I WANT TO DO: SOMETIMES

## 2025-05-28 ASSESSMENT — PROMIS GLOBAL HEALTH SCALE
CARRYOUT_PHYSICAL_ACTIVITIES: MOSTLY
RATE_AVERAGE_PAIN: 7
RATE_MENTAL_HEALTH: GOOD
RATE_PHYSICAL_HEALTH: GOOD
RATE_QUALITY_OF_LIFE: GOOD
CARRYOUT_SOCIAL_ACTIVITIES: GOOD
RATE_GENERAL_HEALTH: GOOD
EMOTIONAL_PROBLEMS: SOMETIMES
RATE_AVERAGE_FATIGUE: MODERATE
RATE_SOCIAL_SATISFACTION: VERY GOOD

## 2025-05-28 ASSESSMENT — ANXIETY QUESTIONNAIRES
PAST MONTH HOW OFTEN HAVE YOU FELT DIFFICULTIES WERE PILING UP SO HIGH THAT YOU COULD NOT OVERCOME THEM: 1 - ALMOST NEVER
PAST MONTH HOW OFTEN HAVE YOU FELT CONFIDENT ABOUT YOUR ABILITY TO HANDLE YOUR PROBLEMS: 3 - FAIRLY OFTEN
PAST MONTH HOW OFTEN HAVE YOU FELT THAT THINGS WERE GOING YOUR WAY: 3 - FAIRLY OFTEN
PAST MONTH HOW OFTEN HAVE YOU FELT CONFIDENT ABOUT YOUR ABILITY TO HANDLE YOUR PROBLEMS: 3 - FAIRLY OFTEN
PAST MONTH HOW OFTEN HAVE YOU FELT THAT YOU WERE UNABLE TO CONTROL THE IMPORTANT THINGS IN YOUR LIFE: 1 - ALMOST NEVER
PAST MONTH HOW OFTEN HAVE YOU FELT THAT YOU WERE UNABLE TO CONTROL THE IMPORTANT THINGS IN YOUR LIFE: 1 - ALMOST NEVER

## 2025-06-04 ENCOUNTER — APPOINTMENT (OUTPATIENT)
Dept: INTEGRATIVE MEDICINE | Facility: CLINIC | Age: 75
End: 2025-06-04
Payer: MEDICARE

## 2025-06-04 DIAGNOSIS — M54.41 CHRONIC BILATERAL LOW BACK PAIN WITH RIGHT-SIDED SCIATICA: ICD-10-CM

## 2025-06-04 DIAGNOSIS — M54.50 CHRONIC BILATERAL LOW BACK PAIN WITHOUT SCIATICA: ICD-10-CM

## 2025-06-04 DIAGNOSIS — G89.29 CHRONIC BILATERAL LOW BACK PAIN WITH RIGHT-SIDED SCIATICA: ICD-10-CM

## 2025-06-04 DIAGNOSIS — G89.29 CHRONIC BILATERAL LOW BACK PAIN WITHOUT SCIATICA: ICD-10-CM

## 2025-06-04 DIAGNOSIS — M54.41 ACUTE RIGHT-SIDED LOW BACK PAIN WITH RIGHT-SIDED SCIATICA: ICD-10-CM

## 2025-06-04 PROCEDURE — 99202 OFFICE O/P NEW SF 15 MIN: CPT

## 2025-06-04 PROCEDURE — 97810 ACUP 1/> WO ESTIM 1ST 15 MIN: CPT

## 2025-06-04 PROCEDURE — 97811 ACUP 1/> W/O ESTIM EA ADD 15: CPT

## 2025-06-04 NOTE — PROGRESS NOTES
"Acupuncture Visit:     Subjective   Patient ID: Obdulia Washington is a 74 y.o. female who presents for Back Pain  SELF PAY (unable to schedule with [employer] until fall)    Left lower back pain  Radiates upward, pain with daily housework activities like vacuuming, cooking, gardening, etc  No pain with sitting and gentle walking     Sharp but not stabbing, can take breath away  No numbness or tingling, does not travel up or down    History of chiropractic and dry needling  \"Hard to tell if it helps\"  No success with PT    Back pain has persist for several years  Has gotten worse with age     Suggested px decrease activity for 2-3 days following treatment, and to start PT exercises again at home        Session Information  Is this acupuncture treatment being billed to the patient's insurance company: No  Visit Type: New patient  Medical History Reviewed: I have reviewed pertinent medical history in EHR, and no contraindications are present to provide treatment         Review of Systems         Provider reviewed plan for the acupuncture session, precautions and contraindications. Patient/guardian/hospital staff has given consent to treat with full understanding of what to expect during the session. Before acupuncture began, provider explained to the patient to communicate at any time if the procedure was causing discomfort past their tolerance level. Patient agreed to advise acupuncturist. The acupuncturist counseled the patient on the risks of acupuncture treatment including pain, infection, bleeding, and no relief of pain. The patient was positioned comfortably. There was no evidence of infection at the site of needle insertions.    Objective   Physical Exam              Acupuncture Treatment  Patient Position: Prone on a table  Acupuncture Needling: Yes  Needle Guage: 36 guage /.20/ Blue seirin, 40 guage /.16/ Red seirin  Body Points: With retention  Body Points - Left: Bl59  Body Points - Bilateral: Du14, Du7, Du4, " HTJJ at T9, T10, T11, Bl23, Bl31  Body Points - Right: Sp6  Auricular Points: No  Acupuncture Treatment Change: No changes  Electroacupuncture Used: No  Other Techniques Utilized: Cupping  Cupping Description: along spine and paraspinals  Needle Retention Time (min): 25 minutes  Total Face to Face Time (min): 45 minutes  Chinese Herbal Therapy 1: Tiger balm and heat daily              Assessment/Plan

## 2025-06-18 ENCOUNTER — APPOINTMENT (OUTPATIENT)
Dept: INTEGRATIVE MEDICINE | Facility: CLINIC | Age: 75
End: 2025-06-18

## 2025-06-18 DIAGNOSIS — M54.59 OTHER LOW BACK PAIN: Primary | ICD-10-CM

## 2025-06-18 PROCEDURE — 97810 ACUP 1/> WO ESTIM 1ST 15 MIN: CPT | Performed by: ACUPUNCTURIST

## 2025-06-18 PROCEDURE — 97811 ACUP 1/> W/O ESTIM EA ADD 15: CPT | Performed by: ACUPUNCTURIST

## 2025-06-18 ASSESSMENT — ENCOUNTER SYMPTOMS: BACK PAIN: 1

## 2025-06-18 NOTE — PROGRESS NOTES
"Acupuncture Visit:     Please note: Dictation software was used while completing this note and may contain spelling, grammatical, and/or syntax errors.  Please contact me with any questions.    To clinicians, I am always happy to partner with you in the care of your patients. Do not hesitate to call the office or contact me directly regarding any further consultations or with questions regarding the plan of care outlined or patient progress.    Subjective   Patient ID: Obdulia Washington is a 75 y.o. female who presents for Back Pain    SELF PAY (unable to schedule with [employer] until fall)    Patient reports a decrease in her sx for about 1 week following her first visit, but describes the past few days as \"as bad as before\"; the pain continues to \"come and go\", noting some aggravation when doing some work in the garden and even walking through the parking lot on the way to her appointment      Initial intake (06/04/2025 MG)    Left lower back pain  Radiates upward, pain with daily housework activities like vacuuming, cooking, gardening, etc  No pain with sitting and gentle walking     Sharp but not stabbing, can take breath away  No numbness or tingling, does not travel up or down    History of chiropractic and dry needling  \"Hard to tell if it helps\"  No success with PT    Back pain has persist for several years  Has gotten worse with age     Suggested px decrease activity for 2-3 days following treatment, and to start PT exercises again at home    Back Pain  This is a chronic problem. The problem has been gradually worsening since onset. The pain is present in the lumbar spine. The pain does not radiate.       Session Information  Is this acupuncture treatment being billed to the patient's insurance company: No  Visit Type: Follow-up visit  Medical History Reviewed: I have reviewed pertinent medical history in EHR, and no contraindications are present to provide treatment  Description of present complaint: Muscle " tension, Myofascial pain, Chronic pain         Review of Systems   Musculoskeletal:  Positive for back pain.            Provider reviewed plan for the acupuncture session, precautions and contraindications. Patient/guardian/hospital staff has given consent to treat with full understanding of what to expect during the session. Before acupuncture began, provider explained to the patient to communicate at any time if the procedure was causing discomfort past their tolerance level. Patient agreed to advise acupuncturist. The acupuncturist counseled the patient on the risks of acupuncture treatment including pain, infection, bleeding, and no relief of pain. The patient was positioned comfortably. There was no evidence of infection at the site of needle insertions.    Objective   Physical Exam                             Assessment/Plan

## 2025-06-23 ENCOUNTER — APPOINTMENT (OUTPATIENT)
Dept: INTEGRATIVE MEDICINE | Facility: CLINIC | Age: 75
End: 2025-06-23

## 2025-06-25 ENCOUNTER — APPOINTMENT (OUTPATIENT)
Dept: INTEGRATIVE MEDICINE | Facility: CLINIC | Age: 75
End: 2025-06-25

## 2025-06-26 ENCOUNTER — ALLIED HEALTH (OUTPATIENT)
Dept: INTEGRATIVE MEDICINE | Facility: CLINIC | Age: 75
End: 2025-06-26

## 2025-06-26 DIAGNOSIS — M54.59 OTHER LOW BACK PAIN: Primary | ICD-10-CM

## 2025-06-26 PROCEDURE — 97811 ACUP 1/> W/O ESTIM EA ADD 15: CPT

## 2025-06-26 PROCEDURE — 97810 ACUP 1/> WO ESTIM 1ST 15 MIN: CPT

## 2025-06-26 NOTE — PROGRESS NOTES
Acupuncture Visit:     Subjective   Patient ID: Obdulia Washington is a 75 y.o. female who presents for Back Pain  SELF PAY (unable to schedule with [employer] until fall)    Continue to work on mid and low back pain  Noticing continual improvement    Today pain is higher than usual closer to T7-T10    Right sided neck pain, going into Gb21  Using eye drops twice daily that is causing tightness in neck and shoulders        Session Information  Is this acupuncture treatment being billed to the patient's insurance company: No  Visit Type: Follow-up visit  Medical History Reviewed: I have reviewed pertinent medical history in EHR, and no contraindications are present to provide treatment         Review of Systems         Provider reviewed plan for the acupuncture session, precautions and contraindications. Patient/guardian/hospital staff has given consent to treat with full understanding of what to expect during the session. Before acupuncture began, provider explained to the patient to communicate at any time if the procedure was causing discomfort past their tolerance level. Patient agreed to advise acupuncturist. The acupuncturist counseled the patient on the risks of acupuncture treatment including pain, infection, bleeding, and no relief of pain. The patient was positioned comfortably. There was no evidence of infection at the site of needle insertions.    Objective   Physical Exam              Acupuncture Treatment  Patient Position: Prone on a table  Acupuncture Needling: Yes  Needle Guage: 36 guage /.20/ Blue seirin, 40 guage /.16/ Red seirin  Body Points: With retention  Body Points - Left: Bl63  Body Points - Bilateral: BL10, Gb20, Du14, Du4, HTJJ at C7, T7, T9, T10  Body Points - Right: Gb21, Bl62  Auricular Points: No  Acupuncture Treatment Change: No changes  Electroacupuncture Used: No  Other Techniques Utilized: TDP Lamp  TDP Lamp Descripton: Du9 area  Needle Count In: 17  Needle Count Out: 17  Needle  Retention Time (min): 25 minutes  Total Face to Face Time (min): 25 minutes              Assessment/Plan

## 2025-06-27 ENCOUNTER — EDUCATION (OUTPATIENT)
Dept: HEMATOLOGY/ONCOLOGY | Facility: CLINIC | Age: 75
End: 2025-06-27

## 2025-06-27 ENCOUNTER — OFFICE VISIT (OUTPATIENT)
Dept: HEMATOLOGY/ONCOLOGY | Facility: CLINIC | Age: 75
End: 2025-06-27
Payer: MEDICARE

## 2025-06-27 VITALS
DIASTOLIC BLOOD PRESSURE: 72 MMHG | RESPIRATION RATE: 16 BRPM | SYSTOLIC BLOOD PRESSURE: 133 MMHG | HEIGHT: 62 IN | BODY MASS INDEX: 32.54 KG/M2 | OXYGEN SATURATION: 95 % | TEMPERATURE: 98.2 F | HEART RATE: 89 BPM | WEIGHT: 176.81 LBS

## 2025-06-27 DIAGNOSIS — C50.912 INFILTRATING DUCTAL CARCINOMA OF LEFT BREAST: ICD-10-CM

## 2025-06-27 PROCEDURE — 3075F SYST BP GE 130 - 139MM HG: CPT | Performed by: INTERNAL MEDICINE

## 2025-06-27 PROCEDURE — 1159F MED LIST DOCD IN RCRD: CPT | Performed by: INTERNAL MEDICINE

## 2025-06-27 PROCEDURE — 99214 OFFICE O/P EST MOD 30 MIN: CPT | Performed by: INTERNAL MEDICINE

## 2025-06-27 PROCEDURE — 3078F DIAST BP <80 MM HG: CPT | Performed by: INTERNAL MEDICINE

## 2025-06-27 PROCEDURE — 1126F AMNT PAIN NOTED NONE PRSNT: CPT | Performed by: INTERNAL MEDICINE

## 2025-06-27 ASSESSMENT — PAIN SCALES - GENERAL: PAINLEVEL_OUTOF10: 0-NO PAIN

## 2025-06-27 NOTE — PROGRESS NOTES
"Patient seen by Dr. Tran today in clinic. Reinforcement education provided regarding next steps with plan of care.      PER DR. TRAN'S FUV NOTE TODAY: \"The patient was evaluated on June 27, 2025 regarding history of pT1a, N0 M0 ER/WI positive infiltrating ductal carcinoma of left breast.  She was placed on Arimidex 1 mg p.o. daily 8 October 2019 discontinued in 2024 after completion of 5 years.  Denied any new symptoms. \"    Pt states she also follows with PCP Dr. Arora every May and October and has labs through his office.  Copy of lab orders provided today.  FUV in 1 year.  Patient verbalizes understanding of plan of care via teachback method.                 "

## 2025-06-27 NOTE — PROGRESS NOTES
Cancer History:          Breast         AJCC Edition: 8th (AJCC), Diagnosis Date: 15-May-2018, IA, cT1a cN0 cM0 G2     Treatment Synopsis:    Patient presented with an abnormal screening mammogram found to have left side abnormality.    She underwent a biopsy on 19 showing a infiltrative ductal carcinoma showing  ER 80%,  ME negative and HER2 negative.   On 10/3/19 she underwent left lumpectomy with a 0.5 cm tumor removed and 2 negative sentinel lymph nodes. Grade 2 was confirmed.      Tumor board recommendations: No oncotype. Endocrine therapy. XRT consult (consider)     Family Cancer History:  Maternal Grandmother had breast cancer.   Maternal Aunt had colon cancer  Mother had colon cancer,  at age 79           History of Present Illness:      ID Statement:    MARIE CHURCHILL is a 73 year old Female        Chief Complaint: breast cancer follow up   Interval History:    Patient presents for a breast cancer follow up visit. States arthritis has been exacerbated being on anastrozole. She had one session of acupuncture, noticed some  relief. Would like to stay on anastrozole at this time. States she is doing well overall. Denies any other concerns at this time.      Her energy is good. Denies issues with sleep or fatigue     She denies any vision changes or headache issues, dizziness or loss of balance     She denies any shortness of breath, cough, or chest pain     She denies any new or unexplained bone aches or pains     She denies any skin lesions or masses, oral sores lesions or infections     She reports a normal appetite and normal bowel movements, and normal urination. Denies any new stomach pains, nausea, or vomiting  The patient was evaluated on 2025 regarding history of pT1a, N0 M0 ER/ME positive infiltrating ductal carcinoma of left breast.  She was placed on Arimidex 1 mg p.o. daily 2019 discontinued in  after completion of 5 years.  Denied any new symptoms.            Review of Systems:   Review of Systems:    ROS 14 points performed, See HPI for exceptions        ·  System Review All other systems have been reviewed and are negative for complaint.            Allergies and Intolerances:       Allergies:         epinephrine: Drug, Other, Active         Zithromax: Drug, Rash, Hives/Urticaria, Active     Outpatient Medication Profile:  * Patient Currently Takes Medications as of 26-Jun-2023 10:37 documented in Structured Notes         losartan 50 mg oral tablet : Last Dose Taken:  , 1 tab(s) orally once a day         Aleve 220 mg oral tablet: Last Dose Taken:           omeprazole 20 mg oral delayed release capsule: Last Dose Taken:  , 1 cap(s)  orally once a day         eye caps: Last Dose Taken:           Vitamin B-100 oral tablet: Last Dose Taken:  , 1 tab(s) orally once a day             Medical History:         Encounter for medication management: ICD-10: Z79.899, Status:  Active         Irritable bowel syndrome: ICD-10: K58.9, Status: Active         Gastroesophageal reflux disease without esophagitis: ICD-10:  K21.9, Status: Active         Pulmonary nodule: ICD-10: R91.1, Status: Active         Palpitations: ICD-10: R00.2, Status: Active         Malignant neoplasm of upper-inner quadrant of left breast in female, estrogen receptor positive : ICD-10: C50.212, Status: Active        Social History:   Social Substance History:  ·  Smoking Status former smoker   ·  Tobacco Use history of abuse   ·  Alcohol Use occasionally   ·  Drug Use denies   ·  Additional History     Primary Support:  (Aleks)  Former Smoker: 18 pack/year  Moderate ETOH use.  Exercises Regularly  (1)           Performance:   ECOG Performance Status: 0 Fully Active         Vitals and Measurements:   Vitals: Temp: 36.8  HR: 79  RR: 18  BP: 137/72  SPO2%:   96   Measurements: HT(cm): 158.5  WT(kg): 79.9  BSA: 1.87   BMI:  31.8   Last 3 Weights & Heights: Date:                           Weight/Scale Type:                     Height:   26-Jun-2023 10:04                79.9  kg                     158.5  cm  27-Jun-2022 09:59                77.6  kg                     158.5  cm      Physical Exam:      Constitutional: Well developed, no distress, alert  and cooperative   Eyes: EOMI, clear sclera   ENMT: mucous membranes moist, no apparent injury,  no lesions seen   Head/Neck: Neck supple   Respiratory/Thorax: Patent airways, CTAB, normal  breath sounds with good chest expansion, thorax symmetric   Cardiovascular: Regular, rate and rhythm, no murmurs,  normal S 1and S 2   Gastrointestinal: Nondistended, soft, non-tender,  no rebound tenderness or guarding, no masses palpable, no organomegaly, +BS   Musculoskeletal: No joint swelling, normal strength   Extremities: normal extremities, no cyanosis edema,  contusions or wounds, no clubbing   Neurological: alert and oriented   Breast: Left breast without masses, skin or nipple  changes. Well healed lumpectomy and SLNbx scar.   Right breast without masses, skin or nipple changes.   Lymphatic: No significant lymphadenopathy   Psychological: Appropriate mood and behavior   Skin: Warm and dry, no lesions, no rashes         Lab Results:           Radiology Result:     ·  Results     FINAL REPORT  Interpreted by: MIRIAM WATERS KATHLEEN, MD and MINA AGUILAR RAJENDRA, MD  09/13/22 12:15  Facility: [facility]     MRN: [id number]  Patient Name: MARIE CHURCHILL     STUDY:  DIGITAL DIAG MAMM BILAT WITH LINDA;  9/13/2022 10:53 am     ACCESSION NUMBER(S):  [id number]     ORDERING CLINICIAN:  ADI BLAKE     INDICATION:  Annual. History of left breast cancer status post lumpectomy. Family  history of breast cancer (maternal grandmother).     COMPARISON:  09/07/2021, 09/03/2020, 10/03/2019.     FINDINGS:  2D and tomosynthesis images were reviewed at 1 mm slice thickness.     There are areas of scattered fibroglandular tissue.  Stable  postoperative scarring is seen in the left  breast. No suspicious  masses or calcifications are identified.     IMPRESSION:  Stable posttreatment changes of the left breast. No mammographic  evidence of malignancy.     BI-RADS CATEGORY:     Category: 2 - Benign.  Recommendation: 1 Year Screening.     For any future breast imaging appointments, please call [phone]).     Patient letter sent SNORM     I personally reviewed the images/study and I agree with the findings  as stated by radiology resident Tate Alfredo MD.     Transcribed By: Interface, User  Electronically Signed By: MIRIAM WATERS KATHLEEN   09/13/22 12:15     FINAL REPORT  Interpreted by: ERWIN LOU MD  10/20/22 11:18  Facility: [facility]     Name: MARIE CHURCHILL    Patient ID: [id number] YOB: 1950 Height: 63.0 in.      Gender:     Female   Exam Date:  10/20/2022 Weight: 167.0 lbs.    Indications: long term current use of aromatase inhibitor, Malignant   neoplasm of upper inner quardrant of left breast in female estrogen   receptor positive, postmenopause    Fractures:                                                                                                                                                                      Treatments:  Calcium, Vitamin D                                                                                                                                                    LEFT FEMUR - TOTAL   The bone mineral density : 0.915 g/cm2   T-score : -0.7    % of young normal mean :91 %   Z-score : 1.0    % of age matched mean : 115 %    % change vs. Previous :       % change vs. Baseline :      *Indicates significant change based on 95% confidence interval.      LEFT FEMUR - NECK   The bone mineral density : 0.884 g/cm2   T-score : -1.1    % of young normal mean: 85 %   Z-score : 0.8    % of age matched mean : 114 %    % change vs. Previous :       % change vs. Baseline :      *Indicates significant change based on 95%  confidence interval.      SPINE L1-L4 (L3)   The bone mineral density is 1.054 g/cm2   T-score : -1.1   % of young normal mean is 89 %   Z-score : 0.7    % of age matched mean is 108 %    % change vs. Previous :       % change vs. Baseline :      *Indicates significant change based on 95% confidence interval.       World Health Organization (WHO) criteria for post-menopausal,    Women:     Normal:       T-score at or above -1 SD          Osteopenia:   T-score between -1 and -2.5 SD     Osteoporosis: T-score at or below -2.5 SD           10-Year Fracture Risk:   Major Osteoporotic Fracture 9.7 %    Hip Fracture                1.5 %    Reported Risk Factors       None        Interpretation:   According to World Health Organization criteria, classification low   bone mass (osteopenia).       Followup recommended on October 2024 or sooner as clinically   warranted.     Transcribed By: Interface, User  Electronically Signed By: ERWIN LOU   10/20/22 11:18           Pathology Results:     ·  Results     I have reviewed this pathology result:   Surgical Pathology [Sep 12 2019 3:49PM] ([id number])     Specimens: LEFT BREAST 11:00 8CMFN     Name MARIE CHURCHILL     Accession #: [id number]   Pathologist: ARGENIS MONIQUE MD   Date of Procedure: 9/9/2019   Date Received: 9/9/2019   Date Reported 9/11/2019   Submitting  Physician: CRISTY LAU MD   Location: [facility]   Copy To/Referring/Attending:   KASANDRA SAMAYOA MD Other External #       FINAL DIAGNOSIS   A. LEFT BREAST, 11:00, 8 CM FROM NIPPLE, ULTRASOUND GUIDED CORE NEEDLE BIOPSY:   --  INVASIVE DUCTAL CARCINOMA, GRADE 1-2, SEE NOTE.     Note: In this limited sample, the invasive carcinoma measures up to 0.5 cm in   greatest dimension. ER, SD and HER2 will be reported in an addendum.     : Dr. Devonte Post       The gross and/or microscopic findings were reviewed in conjunction with   pathology resident, Piedad Mackay MD.                          Electronically Signed Out By ARGENIS MONIQUE MD/LEX   By the signature on this report, the individual or group listed as making the   Final Interpretation/Diagnosis certifies that they have reviewed this case.       Addendum/Procedures:   Special Oncology Report Date  Ordered: 2019 Status: Signed Out   Date Complete: 2019   Date Reported: 2019     Addendum Diagnosis   Patient Age: 69   Surgical/Block Number: [id number]   Specimen Site: Left breast, 11:00, 8 cm from nipple    Specimen Type: Core Needle Biopsy   Time of Specimen Removal: 10:56   Time Placed in 10% NBF : 10:57   Duration of Fixation : 6-72 hours     INTERPRETATION:     ESTROGEN RECEPTOR (CLONE SP1): POSITIVE   Percentage with Nuclear  Stainin - 80%   Intensity of Staining: Moderate to strong     PROGESTERONE RECEPTOR (CLONE 1E2): NEGATIVE   Percentage with Nuclear Staining: <1%   Intensity of Staining: Weak     HER2 (CLONE 4B5): NEGATIVE (1+)      COMMENT:    Internal positive staining controls were identified in this specimen.     RANGES FOR INTERPRETATION:   For ER/MS: Ranges for interpretation: Invasive   carcinoma cells exhibiting greater than or equal to 1%   nuclear  staining are considered POSITIVE. Invasive   carcinoma cells exhibiting less than 1% nuclear   staining are considered NEGATIVE.   (Reference: J Clin Oncol 2010; 29:6088-1630) The   stated steroid receptor activity was derived from rabbit    monoclonal antibody staining on formalin fixed, paraffin   embedded specimens, unless otherwise noted.   The method employed was a standard peroxidase   labeled polymer detection system. Each assay is   performed using appropriate positive  and negative   internal controls.     For HER2: Ranges for interpretation: POSITIVE (3+):   greater than or equal to 10% tumor cells with intense   and uniform staining; EQUIVOCAL (2+): weak to   moderate complete immunoreactivity  in >10% of tumor   cells or  "circumferential intense staining in less than   or equal to 10% of cells; and NEGATIVE (1+): Faint   weak immunoreactivity in >10% of tumor cells, but only   a portion of the membrane is positive; NEGATIVE (0):    No immunoreactivity or immunoreactivity in less than   or equal to 10% of tumor cells.   All tests are performed using a [employer]   HER-2/gaby (4B5) rabbit monoclonal primary antibody on   formalin fixed, paraffin embedded tissue,  unless   otherwise noted. Only invasive carcinoma is evaluated   using the ASCO/CAP scoring system   (Arch PatholLab Med 2014; 138 (2):241-56) unless   otherwise specified. External cell culture and tissue   controls stain appropriately.          Electronically Signed Out By ANUSHA DOMINGO MD/JESSICA   By the signature on this report, the individual or group listed as making the   Final Interpretation/Diagnosis certifies that they have reviewed this case.       Clinical History:    Ultrasound guided vacuum assisted core biopsy of left breast mass 11:00 8cmfn     Specimens Submitted As:   A: LEFT BREAST 11:00 8CMFN     Gross Description:   Received in formalin, labeled with the patient’s name and hospital  number and   \"left breast 11:00 8 cm from nipple \", are multiple irregular/cylindrical   segments of yellow-white fatty soft tissue aggregating to 1.5 x 0.4 x 0.3 cm.   The specimen is submitted in toto in one cassette.   DPG      NOTE: Ischemia time: 10:56 AM.   This specimen was placed into formalin at: 10:57 AM.     dpg/9/9/2019             =========================== Next Report ===========================      Surgical Pathology [Oct 8 2019 9:51AM] ([id number])    Specimens: SENTINEL LYMPH NODE BIOPSY - LEFT /LEFT BREAST  TISSUE /LEFT BREAST TISSUE SUPERIOR /LEFT BREAST TISSUE LATERAL /LEFT BREAST TISSUE MEDIAL /LEFT BREAST TISSUE INFERIOR /LEFT BREAST TISSUE POSTERIOR /LEFT BREAST TISSUE ANTERIOR /LEFT BREASTSKIN /Received fresh for intraoperative " consultation, labeled  with the patient's/Received fresh, on an accugrid labeled with the patient’s name and hospital/Received in formalin, labeled with the patient's name and hospital number/Received in formalin, labeled with the patient's name and hospital number/Received  in formalin, labeled with the patient's name and hospital number/Received in formalin, labeled with the patient's name and hospital number/Received in formalin, labeled with the patient'sname and hospital number/Received in formalin, labeled with the  patient's name and hospital number/Received in formalin, labeled with the patient's name and hospital number     Name MARIE CHURCHILL     Accession #: [id number]   Pathologist: ANUSHA DOMINGO MD   Date of Procedure: 10/3/2019    Date Received: 10/3/2019   Date Reported 10/8/2019   SubmittingPhysician: CRISTY LAU MD   Location: [facility] Other External #       FINAL DIAGNOSIS   A. SENTINEL LYMPH NODE, LEFT, EXCISION:   -- TWO LYMPH NODES, NEGATIVE FOR  CARCINOMA (0/2).     B. LEFT BREAST TISSUE, MAG SEED LOCALIZED PARTIAL MASTECTOMY:   -- INVASIVE DUCTAL CARCINOMA, SEE SYNOPTIC REPORT     C. LEFT BREAST TISSUE SUPERIOR MARGIN , EXCISION:   -- FIBROADIPOSE TISSUE, NEGATIVE FOR CARCINOMA.      D. LEFT BREASTTISSUE, LATERAL MARGIN, EXCISION:   -- BENIGN BREAST TISSUE.     E. LEFT BREAST TISSUE MEDIAL MARGIN, EXCISION:   -- FIBROADIPOSE TISSUE, NEGATIVE FOR CARCINOMA.     F. LEFT BREAST TISSUE, INFERIOR MARGIN, EXCISION:    -- BENIGN BREAST TISSUE.     G. LEFT BREAST TISSUE, POSTERIOR MARGIN, EXCISION:   -- BENIGN BREAST TISSUE.     H. LEFT BREAST TISSUE, ANTERIOR MARGIN, EXCISION:   -- FIBROADIPOSE TISSUE, NEGATIVE FOR CARCINOMA.     I. LEFT  BREAST SKIN, EXCISION:   -- SKIN WITH UNDERLYING SOFT TISSUE, NEGATIVE FOR CARCINOMA     : Dr Blaire Caban     The gross and/or microscopic findings were reviewed in conjunction with   pathology resident, Behiye Goksel, M.D.              CASE SUMMARY REPORT   Invasive Breast Cancer   Staging According to American Joint Committee on   Cancer Staging Manual 8th Edition     Macroscopic:   Specimen: Partial Breast   Procedure:  Excision with wire-guided or Mag seed localization   Lymph Node Sampling: [place] lymph node(s)   Specimen Integrity: Multiple designated specimens   Specimen Laterality: Left   Specimen Size: Greatest dimension 4.0 cm, Additional dimensions  3.0 x 2.2   cm   Tumor Site: Not specified   Tumor Size: Greatest dimension 0.5 cm     Note : The size of tumor, as measured by gross examination must be   verified by microscopic examination. If there is a discrepancy between the    gross and microscopic tumor measurement, the microscopic measurement of the   invasive component takes precedence and should be used for tumor staging. In   some cases, it may be helpful to use information about tumor sizefrom imaging   studies.      Macroscopic and Microscopic Extent of Tumor:   Tumor focality: Single focus of invasive carcinoma   Skin: Skin is not present   Nipple: Nipple is not present   Skeletal muscle: No skeletal muscle present   Histologic Type: Invasive  ductal carcinoma   Histologic grade: Mesa Histologic Score:   Tubule Formation: Moderate 10% to 75% (score = 2)   Nuclear pleomorphism: Marked variation in size, nucleoli, chromatin   clumping (score = 3)   Mitotic count: 0 to  5 mitoses per 10 HPF (score = 1)   Total Gregory Score: Grade II: 6 - 7 points   Lymphatic Vessel invasion: Absent   Ductal Carcinoma In Situ: Absent   Extensive Intraductal Component (EIC): Absent   Lobular Carcinoma in Situ: Absent    Microcalcifications: Not identified     Margins:     Invasive Carcinoma: Negative   All final margins greater than or equal to 2 mm   Ductal Carcinoma In situ: Not Applicable     Extent of Invasion:   TNM descriptors:  None   Primary Tumor (pT): pT1a: Tumor more than 0.1 cm but not more than 0.5 cm   in  "greatest dimension   Lymph nodes: Number Examined: 2   Number with macrometastases (>0.2 cm): 0   Number with micrometastases (>0.2 mm) to 0.2 cm  and/or >200 cells): 0   Number with isolated tumor cells (<= 0.2 mm and <= 200 cells): 0   Regional Lymph Nodes (pN): pN0: No regional lymph node metastasis   histologically   Distant metastasis (pM): pM: Unknown   Estrogen Receptor:  See prior report: [id number] A1   Progesterone Receptor: See prior report: [id number] A1   HER2: See prior report: [id number] A1   Biopsy site change: Yes   Additional pathologic findings: Usual ductal hyperplasia   CMC Breast: Invasive  Additional Testing: Tumor Block: B8   Normal Block: B1     Electronically Signed Out By ANUSHA DOMINGO MD/[facility]   By the signatureon this report, the individual or group listed as making the   Final  Interpretation/Diagnosis certifies that they have reviewed this case.     Intraoperative Consultation:   A: SENTINEL LYMPH NODE BIOPSY - LEFT     Frozen Section 1:   Date Ordered: 10/3/2019 08:42 Date Received: 10/3/2019 08:42 Date    Called: 10/3/2019 08:50     Intraoperative Diagnosis:   Two lymph nodes, negative for carcinoma.   Intraoperative Consult Pathologist(s):   ANUSHA DOMINGO MD (P)       rl/10/3/2019       Clinical History:    Left breast cancer; See orientation       Specimens Submitted As:   A: SENTINEL LYMPH NODE BIOPSY - LEFT   B: LEFT BREAST TISSUE   C: LEFT BREAST TISSUE SUPERIOR   D: LEFT BREAST TISSUE LATERAL   E: LEFT BREAST TISSUE MEDIAL    F: LEFTBREAST TISSUE INFERIOR   G: LEFT BREAST TISSUE POSTERIOR   H: LEFT BREAST TISSUE ANTERIOR   I: LEFT BREAST SKIN     Gross Description:   A: Received fresh for intraoperative consultation, labeled with the patient's    name and hospital number \"sentinellymph node biopsy - left\", is a single   fragment of fibroadipose tissue measuring 3.0 x 2.5 x 0.6 cm. Upon palpation   two lymph nodes are identified. The larger lymph node measures 1.0 cm in " "  greatest dimension  and the smaller lymph node measures 0.5cm in greatest   dimension. The larger lymph node is inked and bisected. The cut surface does   not appear to be grossly involved by carcinoma. The lymph nodes are submitted   for frozen section in one  cassette. Entire specimen is submitted in 2   cassettes.   ACG     B:Received fresh, on an accugrid labeled with the patient’s name and hospital   number and \"left breast tissue\", is an irregular segment of yellow lobulated    fibrofatty tissue, 4.0 (medial lateral) by 3.0 (anterior-posterior) by 2.2   (superior inferior)cm. The specimen is oriented with a short superior and a   long lateral stitch. A localizing needle is not identified. The specimen is   inked  unconventionally in the following manner: Red superior, orange lateral,   yellow medial, greenanterior, blue inferior, black posterior. The specimen is   serially sectioned from medial to lateral. The cut surface reveals a poorly   defined  white firm lesion, 0.5 x 0.5 x 0.5 cm. The lesion is 0.4 cm from the   anterior margin, 0.4 cm from the posterior margin, 0.2 cm from the superior   margin, 0.6 cm from the inferior margin, 1.5 cm from the lateral margin, and   1.5 cm from  the medial margin. A hemorrhagic biopsy cavity spiral clip is   identified measuring 0.6 x 0.2 x 0.2 cm and is located0.1 cm from the superior   margin. The remainder of the breast parenchyma is yellow and fatty. No other   lesions are identified.  A photograph has been taken. The specimen is entirely   submitted in 10 cassettes.   IAD     NOTE: Ischemia time: 08:55.   This specimen was placed into formalin at: 09:25.       C: Received in formalin, labeled with the patient's  name and hospital number   and \"C, red superior\", is an irregular segment of yellow lobulated fibrofatty   tissue, 2.0 x 1.2 x 0.4 cm. The specimen is oriented with red ink on the new   margin. The specimen is serially sectioned to reveal  a homogeneous " "fatty cut   surface. No lesion is identified. The specimen is entirely submitted in one   cassette.   DJO     D: Received in formalin, labeled with the patient's name and hospital number   and \"D, orange lateral\",  is an irregular segment of yellow lobulated fibrofatty   tissue, 2.2 x 1.5 x 0 cm. The specimen is oriented with orange ink the new   margin. The specimen is serially sectioned to reveal a homogeneous fatty cut   surface. No lesion is identified.  The specimen is entirely submitted in one   cassette.   DJO     E: Received in formalin, labeled with the patient's name and hospital number   and \"E, yellow medial\", is an irregular segment of yellow lobulated fibrofatty   tissue,  1.9 x 1.2 x 0.6 cm. The specimen is oriented with yellow ink on the   new margin. The specimen is serially sectioned to reveal a homogeneous fatty   cut surface. No lesion is identified. The specimen is entirely submitted in   one cassette.    DJO     F: Received in formalin, labeled with the patient's name and hospital number   and \"F, blue inferior\", is an irregular segment of yellow lobulated fibrofatty   tissue, 3.2 x 1.5 x 0.5 cm. The specimen is oriented with blue ink on  the new   margin. The specimen is serially sectioned to reveal a homogeneous fatty cut   surface. No lesion is identified. The specimen is entirely submitted in 2   cassettes.   DJO     G: Received in formalin, labeled with the  patient's name and hospital number   and \"G, black posterior\", is an irregular segment of yellow lobulated   fibrofatty tissue, 1.8 x 1.2 x 0.4 cm. The specimen is oriented with black ink   on the new margin. The specimen is serially sectioned  to reveal a homogeneous   fatty cut surface. No lesion is identified. The specimen is entirely   submitted in one cassette.   DJO     H: Received in formalin, labeled with the patient's name and hospital number   and \"H, green anterior\",  is an irregular segmentof yellow lobulated fibrofatty " "  tissue, 2.0 x 1.2 x 0.5 cm. The specimen is oriented with green ink on the new   margin. The specimen is serially sectioned to reveal a homogeneous fatty cut   surface. No lesion is identified.  The specimen is entirely submitted in one   cassette.   DJO     I: Received in formalin, labeled with the patient's name and hospital number   and \"I, left breast skin\", are multiple segments of scarred skin aggregating to   2.5  x 2.0 x 0.7 cm. A scar is not apparent. The specimen is submitted entirely   in one cassette.   DJO     Summary of Cassettes:   Specimen Label Site   B 1 slice one, most medial   2 slice 2, anterior, inferior, posterior, medial    3 slice 3, anterior, posterior, inferior, medial   4 slice 4, anterior, posterior, inferior   5 slice 5, anterior, posterior, lesion, inferior, superior   6 slice 6, anterior, posterior, lateral, superior, lesion   7 slice 7,anterior,  posterior, lateral, superior, biopsy cavity,   lesion, clip   8 slice 8, superior, lateral, biopsy cavity   9 slice 9, lateral, superior   10 slice 10, most lateral       rl/10/3/2019      Assessment and Plan:         1. Left sided breast cancer Dx in 9/2019:   Patient with early stage left sided breast cancer s/p full excision and avoiding local radiation.    On arimidex since 10/2019, likely only need 5 years of therapy given low risk features however would consider BCI testing to decide duration at that time.  The patient to receive Arimidex through the end of 2024 and then discontinue.  Mammogram in September 2025.   No evidence of breast cancer recurrence per patient history, physical examination and imaging findings.   Last mammogram 9/2023 reviewed, continue yearly   Last DEXA 5/22/25 reviewed with osteopenia.        3. ETOH intake   1-2 drinks a day   Discussed limiting to 3-4 drinks maximum         RTC 1 year with SHARON.   "

## 2025-06-30 ENCOUNTER — APPOINTMENT (OUTPATIENT)
Dept: INTEGRATIVE MEDICINE | Facility: CLINIC | Age: 75
End: 2025-06-30

## 2025-07-02 ENCOUNTER — OFFICE (OUTPATIENT)
Dept: URBAN - METROPOLITAN AREA CLINIC 27 | Facility: CLINIC | Age: 75
End: 2025-07-02
Payer: MEDICARE

## 2025-07-02 VITALS
DIASTOLIC BLOOD PRESSURE: 74 MMHG | SYSTOLIC BLOOD PRESSURE: 133 MMHG | WEIGHT: 177 LBS | TEMPERATURE: 97.9 F | HEART RATE: 77 BPM | HEIGHT: 63 IN

## 2025-07-02 DIAGNOSIS — R15.9 FULL INCONTINENCE OF FECES: ICD-10-CM

## 2025-07-02 PROCEDURE — 99213 OFFICE O/P EST LOW 20 MIN: CPT | Performed by: INTERNAL MEDICINE

## 2025-07-03 ENCOUNTER — APPOINTMENT (OUTPATIENT)
Dept: INTEGRATIVE MEDICINE | Facility: CLINIC | Age: 75
End: 2025-07-03

## 2025-07-03 DIAGNOSIS — M54.2 NECK PAIN: ICD-10-CM

## 2025-07-03 DIAGNOSIS — M54.59 OTHER LOW BACK PAIN: Primary | ICD-10-CM

## 2025-07-03 PROCEDURE — 97810 ACUP 1/> WO ESTIM 1ST 15 MIN: CPT

## 2025-07-03 PROCEDURE — 97811 ACUP 1/> W/O ESTIM EA ADD 15: CPT

## 2025-07-03 NOTE — PROGRESS NOTES
Acupuncture Visit:     Subjective   Patient ID: Obdulia Washington is a 75 y.o. female who presents for Back Pain  SELF PAY    Continue working on low back / mid back pain  Px gaining relief though still feels intense pain near T12, L1, especially with twisting  Some pain referring to left side    Continue working on right upper neck and pain   This is also improving slowly        Session Information  Is this acupuncture treatment being billed to the patient's insurance company: No  Visit Type: Follow-up visit  Medical History Reviewed: I have reviewed pertinent medical history in EHR, and no contraindications are present to provide treatment         Review of Systems         Provider reviewed plan for the acupuncture session, precautions and contraindications. Patient/guardian/hospital staff has given consent to treat with full understanding of what to expect during the session. Before acupuncture began, provider explained to the patient to communicate at any time if the procedure was causing discomfort past their tolerance level. Patient agreed to advise acupuncturist. The acupuncturist counseled the patient on the risks of acupuncture treatment including pain, infection, bleeding, and no relief of pain. The patient was positioned comfortably. There was no evidence of infection at the site of needle insertions.    Objective   Physical Exam              Acupuncture Treatment  Patient Position: Prone on a table  Acupuncture Needling: Yes  Needle Guage: 40 guage /.16/ Red seirin, 36 guage /.20/ Blue seirin  Body Points: With retention  Body Points - Left: Sp6  Body Points - Bilateral: Du14, Du4, HTJJ at T7, T9, T10, T11, T12, Gb25  Body Points - Right: Gb20, Gb21, Si3  Auricular Points: No  Acupuncture Treatment Change: No changes  Electroacupuncture Used: No  Other Techniques Utilized: TDP Lamp  TDP Lamp Descripton: over Du4  Needle Count In: 18  Needle Count Out: 18  Needle Retention Time (min): 25 minutes  Total  Face to Face Time (min): 25 minutes              Assessment/Plan

## 2025-07-07 ENCOUNTER — APPOINTMENT (OUTPATIENT)
Dept: INTEGRATIVE MEDICINE | Facility: CLINIC | Age: 75
End: 2025-07-07

## 2025-07-08 ENCOUNTER — APPOINTMENT (OUTPATIENT)
Dept: INTEGRATIVE MEDICINE | Facility: CLINIC | Age: 75
End: 2025-07-08

## 2025-07-10 ENCOUNTER — APPOINTMENT (OUTPATIENT)
Dept: AUDIOLOGY | Facility: CLINIC | Age: 75
End: 2025-07-10
Payer: MEDICARE

## 2025-07-10 ENCOUNTER — ALLIED HEALTH (OUTPATIENT)
Dept: INTEGRATIVE MEDICINE | Facility: CLINIC | Age: 75
End: 2025-07-10

## 2025-07-10 DIAGNOSIS — M54.59 OTHER LOW BACK PAIN: Primary | ICD-10-CM

## 2025-07-10 DIAGNOSIS — H90.A22 SENSORINEURAL HEARING LOSS (SNHL) OF LEFT EAR WITH RESTRICTED HEARING OF RIGHT EAR: Primary | ICD-10-CM

## 2025-07-10 PROBLEM — H91.90 HEARING LOSS: Status: ACTIVE | Noted: 2025-07-10

## 2025-07-10 PROCEDURE — 92557 COMPREHENSIVE HEARING TEST: CPT | Performed by: AUDIOLOGIST

## 2025-07-10 PROCEDURE — 92550 TYMPANOMETRY & REFLEX THRESH: CPT | Performed by: AUDIOLOGIST

## 2025-07-10 PROCEDURE — 97811 ACUP 1/> W/O ESTIM EA ADD 15: CPT

## 2025-07-10 PROCEDURE — 97810 ACUP 1/> WO ESTIM 1ST 15 MIN: CPT

## 2025-07-10 NOTE — PROGRESS NOTES
COMPREHENSIVE AUDIOMETRIC EVALUATION      Name:  Obdulia Washington  :  1950  Age:  75 y.o.  Date of Evaluation:  07/10/25   Referring Provider:  Johnny Arora MD     History:  Ms. Washington was seen today for an evaluation of hearing.  Patient reported slight imbalance, though attributed it to blood pressure. Of note, through review of medical records, patient has a history of prediabetes.  When asked, patient denied otalgia, aural fullness, tinnitus, concern for hearing loss, medical history significant for diabetes, and treatment by chemotherapy or radiation    See audiometric evaluation at end of this report or scanned under media tab    OTOSCOPY:       Right Ear: Clear canal       Left Ear: Clear canal    226 Hz TYMPANOMETRY:       Right Ear: Type A: normal peak pressure, compliance, and ear canal volume, consistent with middle ear function within normal limits       Left Ear: Type A: normal peak pressure, compliance, and ear canal volume, consistent with middle ear function within normal limits    AUDIOMETRIC EVALUATION (Phones):       Right Ear: Mild rising to Normal through 4000 Hz sloping back to Mild, Unspecified hearing loss                 Left Ear: Essentially Normal through 3000 Hz sloping Mild, Sensorineural hearing loss           Test technique:  Standard Audiometry  Reliability:   good    SPEECH RECOGNITION THRESHOLD:       Right Ear:  15 dBHL in good agreement with PTA       Left Ear:  10 dBHL in good agreement with PTA    WORD RECOGNITION:       Right Ear:  100%  excellent (%) at normal presentation level       Left Ear:   90%  excellent (%) at normal presentation level    IPSILATERAL ACOUSTIC REFLEXES:       Right Ear:  Elevated/absent 500-2000 Hz       Left Ear:    Elevated/absent 500-2000 Hz  Note: Absent acoustic reflexes are an unexpected finding considering patient's hearing loss, however, this finding in isolation should be interpreted with caution.    DISCUSSION:    Discussed results and recommendations with patient.  Questions were addressed and the patient was encouraged to contact our department should concerns arise.    RECOMMENDATIONS:  -Recommend patient return for repeated audiometric evaluation should concerns for changes in hearing sensitivity arise or as medically indicated.    ZULLY Akbar.  [facility] Audiology Student      Filemon Ventura, CCC-A     Appt: 11:00 - 11:30 AM

## 2025-07-10 NOTE — PROGRESS NOTES
Acupuncture Visit:     Subjective   Patient ID: Obdulia Washington is a 75 y.o. female who presents for Back Pain  SELF PAY    Sciatic improved    Left sided back pain continues lateral to T10-T12        Session Information  Is this acupuncture treatment being billed to the patient's insurance company: No  Visit Type: Follow-up visit  Medical History Reviewed: I have reviewed pertinent medical history in EHR, and no contraindications are present to provide treatment         Review of Systems         Provider reviewed plan for the acupuncture session, precautions and contraindications. Patient/guardian/hospital staff has given consent to treat with full understanding of what to expect during the session. Before acupuncture began, provider explained to the patient to communicate at any time if the procedure was causing discomfort past their tolerance level. Patient agreed to advise acupuncturist. The acupuncturist counseled the patient on the risks of acupuncture treatment including pain, infection, bleeding, and no relief of pain. The patient was positioned comfortably. There was no evidence of infection at the site of needle insertions.    Objective   Physical Exam              Acupuncture Treatment  Patient Position: Prone on a table  Acupuncture Needling: Yes  Needle Guage: 36 guage /.20/ Blue seirin, 40 guage /.16/ Red seirin  Body Points: With retention  Body Points - Left: Sp6  Body Points - Bilateral: Du4, HTJJ at T7, T9, T10, T11, T12, BL31  Auricular Points: No  Acupuncture Treatment Change: No changes  Electroacupuncture Used: No  Other Techniques Utilized: TDP Lamp  TDP Lamp Descripton: over T10-T12 Left side  Needle Count In: 14  Needle Count Out: 14  Needle Retention Time (min): 25 minutes  Total Face to Face Time (min): 20 minutes              Assessment/Plan

## 2025-07-17 ENCOUNTER — APPOINTMENT (OUTPATIENT)
Dept: INTEGRATIVE MEDICINE | Facility: CLINIC | Age: 75
End: 2025-07-17
Payer: MEDICARE

## 2025-07-17 DIAGNOSIS — M54.59 OTHER LOW BACK PAIN: Primary | ICD-10-CM

## 2025-07-17 NOTE — PROGRESS NOTES
Acupuncture Visit:     Subjective   Patient ID: Obdulia Washington is a 75 y.o. female who presents for Back Pain  SELF PAY    Continue working on low back pain  Some improvements after last tx  Did a lot of housework this weekend, which aggravated it.         Session Information  Is this acupuncture treatment being billed to the patient's insurance company: No  Visit Type: Follow-up visit  Medical History Reviewed: I have reviewed pertinent medical history in EHR, and no contraindications are present to provide treatment         Review of Systems         Provider reviewed plan for the acupuncture session, precautions and contraindications. Patient/guardian/hospital staff has given consent to treat with full understanding of what to expect during the session. Before acupuncture began, provider explained to the patient to communicate at any time if the procedure was causing discomfort past their tolerance level. Patient agreed to advise acupuncturist. The acupuncturist counseled the patient on the risks of acupuncture treatment including pain, infection, bleeding, and no relief of pain. The patient was positioned comfortably. There was no evidence of infection at the site of needle insertions.    Objective   Physical Exam              Acupuncture Treatment  Patient Position: Prone on a table  Acupuncture Needling: Yes  Needle Guage: 40 guage /.16/ Red seirin, 36 guage /.20/ Blue seirin  Body Points: With retention  Body Points - Left: Sp6  Body Points - Bilateral: Du14, Du4, Bl23, HTJJ T9, T12, Gb25  Body Points - Right: Gb34  Auricular Points: No  Acupuncture Treatment Change: No changes  Electroacupuncture Used: No  Needle Count In: 12  Needle Count Out: 12  Needle Retention Time (min): 25 minutes  Total Face to Face Time (min): 25 minutes              Assessment/Plan

## 2025-07-21 NOTE — PROGRESS NOTES
Integrative Medicine Visit:     Subjective   Patient ID: Obdulia Washington is a 75 y.o. female with PMH of breast CA - IDC 9/12/19 s/p left lumpectomy and adjuvant therapy (completed 9/2024) who presents for chronic low back pain.       HPI    Chronic back pain:   Timing (onset, persistence [whether constant or intermittent], pattern and degree of fluctuation and frequency of remissions, duration)  3-4 years, no specific onset. Over the past several months the pain has gotten progressively worse, affecting ADLs - decreased mobility, needing to sit down     Pain intermittent, worse with activity     Quality - aching, throbbing      Severity - 7.5/10      Location (localized, diffuse, deep, superficial) - diffuse      Radiation pattern - left flank, radiates across thoracic region bilaterally      Exacerbating and relieving factors - exacerbating - movement. Relieving - sitting       Seen by pain management 10/2023 - recommended PT; she completed this without relief. Has tried chiropractic care   Seen by PCP on 5/20 for back pain       Pt is NOT currently taking any anticoagulation. Pt does NOT take Aspirin        Pt denies any changes or difficulty with voiding or bowel habits; denies retention or incontinence. Pt denies numbness, tingling or weakness in B/L lower and upper extremities. Pt denies a loss of sensation in the buttocks, perineum, and inner thighs.       Review of Systems        Objective   There were no vitals taken for this visit.    RESULTS:  Lab Results   Component Value Date    WBC 6.0 05/19/2025    HGB 12.2 05/19/2025    HCT 37.3 05/19/2025     05/19/2025    CHOL 237 (H) 05/19/2025    TRIG 143 05/19/2025    HDL 71 05/19/2025    ALT 26 05/19/2025    AST 22 05/19/2025     05/19/2025    K 3.7 05/19/2025    CL 98 05/19/2025    CREATININE 0.89 05/19/2025    BUN 18 05/19/2025    CO2 29 05/19/2025    TSH 0.88 05/19/2025    HGBA1C 6.1 (H) 05/19/2025     Vitamin D: 60    DEXA  5/22/25:  IMPRESSION:  According to World Health Organization criteria, classification is  low bone mass (osteopenia)    MRI lumbar spine 9/19/23:  FINDINGS:  Segmentation: Normal.     Conus: The lower thoracic cord appears unremarkable. The conus  terminates at the L1 vertebral body level. Cauda equina are  unremarkable.     Epidural fluid: None.     Alignment: Unchanged mild levo scoliosis of the upper lumbar spine.  Trace retrolisthesis of L1 on L2.     Vertebral bodies: Normal.     Marrow signal: Nonspecific marrow heterogeneity. No suspicious STIR  hyperintensity or marrow edema. Type 2 Modic endplate signal change  involving L1-L2 and L2-L3.     Intervertebral discs: Mild multilevel degenerative disc height loss.        Degenerative change:     T12-L1: Unremarkable.     L1-2: Minimal noncompressive disc bulge.     L2-3: Mild facet arthropathy. Mild disc bulge. No spinal canal  stenosis. Minimal neural foraminal narrowing.     L3-4: Mild disc bulge with superimposed small left foraminal disc  protrusion component. No spinal canal stenosis. Mild left and minimal  right neural foraminal narrowing.     L4-5: Mild facet arthropathy. Minimal disc bulge. No spinal canal  stenosis. Mild bilateral neural foraminal narrowing.     L5-S1: Moderate left and mild right facet arthropathy. No spinal  canal stenosis. Mild bilateral neural foraminal narrowing.     Soft tissues: The prevertebral and posterior paraspinal soft tissues  are unremarkable.      Impression   Unchanged lumbar scoliosis with mild lumbar spondylosis and lower  lumbar facet arthropathy. There is no spinal canal stenosis at any  level. There is mild scattered neural foraminal narrowing as  described above.       Physical Exam        Assessment/Plan   There are no diagnoses linked to this encounter.    Symptoms addressed:  Chronic low back pain    Plan:  Chronic back pain:  -Recommend acupuncture; 6 weekly visits initial, can continue if improvement in  symptoms   -Recommend ongoing management with pain management and PCP      She denies any red flag symptoms including weight loss, fevers, chills, night time awakening of pain, bowel bladder incontinence, saddle anesthesia, progressive numbness or weakness. We discussed that if these symptoms should arise she should seek emergency treatment.       Explained briefly what acupuncture is and how it can be helpful for back pain. Recommend six once weekly visits of acupuncture as trial to see if this improves their back pain. Recommend continue more treatments if it is helpful. Discussed risks of acupuncture which include bleeding, bruising, dizziness. Recommend to eat and drink prior to acupuncture appointments. Patient questions answered. Risks and benefits of care were reviewed.      Follow up: as needed; call the office to schedule: [phone]        Time: I personally spent over half of a total 30 minutes in counseling and discussion with the patient and coordination of care as described above.      Adina Benites, APRN-CNP

## 2025-07-22 ENCOUNTER — NUTRITION (OUTPATIENT)
Dept: NUTRITION | Facility: CLINIC | Age: 75
End: 2025-07-22
Payer: MEDICARE

## 2025-07-22 VITALS — HEIGHT: 62 IN | WEIGHT: 173.94 LBS | BODY MASS INDEX: 32.01 KG/M2

## 2025-07-22 DIAGNOSIS — R73.03 PRE-DIABETES: Primary | ICD-10-CM

## 2025-07-22 PROCEDURE — 97802 MEDICAL NUTRITION INDIV IN: CPT | Performed by: DIETITIAN, REGISTERED

## 2025-07-22 SDOH — ECONOMIC STABILITY: FOOD INSECURITY: WITHIN THE PAST 12 MONTHS, YOU WORRIED THAT YOUR FOOD WOULD RUN OUT BEFORE YOU GOT MONEY TO BUY MORE.: NEVER TRUE

## 2025-07-22 SDOH — SOCIAL STABILITY: SOCIAL NETWORK: I HAVE TROUBLE DOING ALL OF MY USUAL WORK (INCLUDE WORK AT HOME): USUALLY

## 2025-07-22 SDOH — ECONOMIC STABILITY: FOOD INSECURITY: WITHIN THE PAST 12 MONTHS, THE FOOD YOU BOUGHT JUST DIDN'T LAST AND YOU DIDN'T HAVE MONEY TO GET MORE.: NEVER TRUE

## 2025-07-22 SDOH — SOCIAL STABILITY: SOCIAL NETWORK: I HAVE TROUBLE DOING ALL OF THE ACTIVITIES WITH FRIENDS THAT I WANT TO DO: SOMETIMES

## 2025-07-22 SDOH — SOCIAL STABILITY: SOCIAL NETWORK: I HAVE TROUBLE DOING ALL OF THE FAMILY ACTIVITIES THAT I WANT TO DO: USUALLY

## 2025-07-22 SDOH — SOCIAL STABILITY: SOCIAL NETWORK: PROMIS ABILITY TO PARTICIPATE IN SOCIAL ROLES & ACTIVITIES T-SCORE: 42

## 2025-07-22 SDOH — SOCIAL STABILITY: SOCIAL NETWORK: I HAVE TROUBLE DOING ALL OF MY REGULAR LEISURE ACTIVITIES WITH OTHERS: SOMETIMES

## 2025-07-22 SDOH — SOCIAL STABILITY: SOCIAL NETWORK

## 2025-07-22 ASSESSMENT — PROMIS GLOBAL HEALTH SCALE
RATE_AVERAGE_FATIGUE: MILD
RATE_AVERAGE_PAIN: 7
RATE_QUALITY_OF_LIFE: GOOD
RATE_GENERAL_HEALTH: GOOD
RATE_MENTAL_HEALTH: VERY GOOD
CARRYOUT_SOCIAL_ACTIVITIES: GOOD
EMOTIONAL_PROBLEMS: SOMETIMES
RATE_PHYSICAL_HEALTH: GOOD
RATE_SOCIAL_SATISFACTION: VERY GOOD
CARRYOUT_PHYSICAL_ACTIVITIES: MODERATELY

## 2025-07-22 ASSESSMENT — ANXIETY QUESTIONNAIRES
PAST MONTH HOW OFTEN HAVE YOU FELT THAT YOU WERE UNABLE TO CONTROL THE IMPORTANT THINGS IN YOUR LIFE: 0 - NEVER
PAST MONTH HOW OFTEN HAVE YOU FELT DIFFICULTIES WERE PILING UP SO HIGH THAT YOU COULD NOT OVERCOME THEM: 0 - NEVER
PAST MONTH HOW OFTEN HAVE YOU FELT CONFIDENT ABOUT YOUR ABILITY TO HANDLE YOUR PROBLEMS: 4 - VERY OFTEN
PAST MONTH HOW OFTEN HAVE YOU FELT THAT YOU WERE UNABLE TO CONTROL THE IMPORTANT THINGS IN YOUR LIFE: 0 - NEVER
PAST MONTH HOW OFTEN HAVE YOU FELT CONFIDENT ABOUT YOUR ABILITY TO HANDLE YOUR PROBLEMS: 4 - VERY OFTEN
PAST MONTH HOW OFTEN HAVE YOU FELT THAT THINGS WERE GOING YOUR WAY: 4 - VERY OFTEN

## 2025-07-22 NOTE — PATIENT INSTRUCTIONS
"Eating three meals per day can increase metabolism, this is called the thermal effect of eating. Eating three meals burns more calories than two meals consumed per day. Strive to eat breakfast within 1 -2 hours of waking to jump start the metabolism. Aim for breakfast at 8:30-9:30, lunch at noon-1:00, snack at 5:00 if needed, dinner at 6:00-7:00 and a bedtime snack at 10:00-11:00 pm.    Strive to include protein in the meals and even snacks. Protein in meals can increase metabolism too.  Protein in snacks can sustain energy, stabilize blood glucose, and provide more contentment. Protein foods include eggs, egg whites, cheese, nuts, nut butters, Greek yogurt, poultry, meat, fish, tofu, plant-based protein powder, and/or plant-based protein drinks.     The plate method was recommended to help portion foods at meals and to structure. Using a 9\" plate, recommended for 1/2 of the plate to include non-starchy vegetable and suggested to consume this first.  Recommended protein to be included but limited to 3 ounces at meals. Recommended 1/4 of the plate or 1 cup of grain or starch with fruit, milk or yogurt at meals. For more information on the plate method visit myplate.gov.     Even snacks strive to consume carbohydrates and protein. Carbohydrates provide energy and protein helps to sustain the energy. Having carbohydrates by itself can increase blood glucose; thus, strive to pair.       Aparna Novoa, MS, RDN, LD, BAKARI, MB-EAT-P   Advanced Practice Clinical Dietitian   Mindfulness-Based Eating Awareness Training Practitioner   [facility]   [facility] [place]   [email or web address]   Scheduling Line [phone]   Direct Line [phone]    "

## 2025-07-22 NOTE — PROGRESS NOTES
Nutrition Initial Assessment:     Patient Obdulia Washington is a 75 y.o. female being seen [facility] who was referred by Dr. Johnny Arora on 05/20/25 for pre-diabetes.    1. Pre-diabetes [R73.03]                 Nutrition Assessment    Problem List[1]      Diagnosis    Benign essential HTN    Allergic rhinitis    Breast cancer, left    Dense breast tissue on mammogram    Elevated fasting blood sugar    Hyperlipidemia    Infiltrating ductal carcinoma of left breast    Iron deficiency anemia    Leg mass, left    Palpitations    Pronation deformity of both feet    Pulmonary nodule    PVC (premature ventricular contraction)    Situational anxiety    Vitamin D deficiency    Vitamin B deficiency    Varicose vein of leg    Postmenopausal    Obesity with body mass index 30 or greater    Chronic bilateral low back pain    Acute right-sided low back pain with right-sided sciatica    Other specified peripheral vascular diseases    Screening for cardiovascular condition    Pre-diabetes    Hearing loss       Medications:  Current Outpatient Medications   Medication Instructions    amLODIPine (NORVASC) 2.5 mg, oral, 2 times daily    ammonium lactate (Lac-Hydrin) 12 % lotion     b complex 0.4 mg tablet 1 tablet, Daily    CALCIUM ACETATE ORAL Take by mouth.    chlorthalidone (HYGROTON) 25 mg, oral, Daily    docosahexaenoic acid/vit C/lut (EYE HEALTH ORAL) Take by mouth.    ergocalciferol (VITAMIN D-2) 1.25 mg, oral, Weekly    losartan (COZAAR) 50 mg, oral, 2 times daily    magnesium 250 mg tablet Take by mouth.    metroNIDAZOLE 1 % gel with pump apply a thin layer to full face DAILY    naproxen sodium (ALEVE ORAL) Take by mouth.    omeprazole (PRILOSEC) 20 mg, oral, Daily        Nutrition History:  Pt states she can not lose weight. She had breast cancer 7 years ago. She had started gaining weight after this.   A1c is at 6.1%.     Food & Nutrition History:   Food Allergies: None;  Food Intolerances: None  Vitamin/mineral intake:  "B-complex , Vitamin D, Calcium, Magnesium  Herbal supplements: None  Medication and Complementary/Alternative Medicine Use:   GI Symptoms: reflux  Mouth Issues: denies; Teeth Issues: no issues  Sleep Habits: 7+ hours disrupted pt has difficulty going back to bed once waking up     Pt wakes up feeling okay. Pt wakes up by 7:30 am. 2- 3 meals are consumed per day  Diet Recall:  Meal 1: Breakfast is at 10:00 to include one serving of oatmeal, fat free milk and 2 T of raisins   Snack:   Meal 2:  Lunch is at 2:00- 3:00 to include a sandwich or large  salad with greens, parm, chicken   Snack:  Meal 3: Dinner is at 7:00 pm to include 4 ounces of chicken, 0.5-1 cup of broccoli, potato   Snack:  9:00-10:00 to include pretzels   Food Variety: Present  Oral Nutrition Supplement Use: None   Fluid Intake: 5 ounces of wine daily and 32 ounces of water, coffee, and herbal tea  per day   Energy Intake: Good (>/= 75% EEN)      Food Preparation:  Cooking: Patient, Partner/Spouse  Grocery Shopping: Patient, Partner/Spouse  Dining Out: 1 to 3 times a month    Physical Activity:   30-60 minutes of light swimming    Food Security/Insecurity: n/a    Anthropometrics:  Height: 157.9 cm (5' 2.17\")   Weight: 78.9 kg (173 lb 15.1 oz)   BMI (Calculated): 31.65                 Weight History:   Daily Weight  07/22/25 : 78.9 kg (173 lb 15.1 oz)  06/27/25 : 80.2 kg (176 lb 12.9 oz)  05/20/25 : 79.8 kg (176 lb)  10/11/24 : 79.2 kg (174 lb 11.2 oz)  10/05/24 : 80.2 kg (176 lb 12.8 oz)  09/18/24 : 78.9 kg (174 lb)  06/27/24 : 79.4 kg (175 lb 0.7 oz)  05/24/24 : 78.7 kg (173 lb 6.4 oz)  10/13/23 : 78.5 kg (173 lb)  10/10/23 : 80 kg (176 lb 6.4 oz)    Weight Change %:       Nutrition Focused Physical Exam Findings:  Subcutaneous Fat Loss:   Defer Subcutaneous Fat Loss Assessment: Defer all  Defer All Reason: visually appears nourished    Muscle Wasting:  Defer Muscle Wasting Assessment: Defer all  Defer All Reason: visually appears " nourished    Physical Findings:  Hair: Negative  Eyes: Negative  Nails: Negative  Skin: Negative  Respiratory: Negative  Edema: none      Nutrition Significant Labs:  Last Chem:     Chemistry    Lab Results   Component Value Date/Time     05/19/2025 0828    K 3.7 05/19/2025 0828    CL 98 05/19/2025 0828    CO2 29 05/19/2025 0828    BUN 18 05/19/2025 0828    CREATININE 0.89 05/19/2025 0828    Lab Results   Component Value Date/Time    CALCIUM 9.5 05/19/2025 0828    ALKPHOS 29 (L) 05/19/2025 0828    AST 22 05/19/2025 0828    ALT 26 05/19/2025 0828    BILITOT 0.4 05/19/2025 0828       , CMP Trend:    Recent Labs     05/19/25  0828 10/07/24  0922 05/22/24  0811   GLUCOSE 108* 108* 104*    140 142   K 3.7 4.0 4.0   CL 98 101 100   CO2 29 28 29   ANIONGAP 12 15 17   BUN 18 22 23   CREATININE 0.89 0.98 0.95   EGFR 67 60* 63   CALCIUM 9.5 9.6 9.9   ALBUMIN 4.3 4.3 4.4   ALKPHOS 29* 36 33   PROT 7.0 7.2 7.6   AST 22 23 26   BILITOT 0.4 0.4 0.5   ALT 26 26 24   , CBC Trend:   Recent Labs     05/19/25  0828 10/07/24  0922 05/22/24  0811 09/19/23  0937   WBC 6.0 8.1 5.2 4.9   NRBC  --  0.0 0.0 0.0   RBC 4.23 4.32 4.29 4.16   HGB 12.2 12.3 11.9* 11.6*   HCT 37.3 36.4 39.0 36.9   MCV 88.2 84 91 89   MCH 28.8 28.5 27.7  --    MCHC 32.7 33.8 30.5* 31.4*   RDW 12.8 13.2 14.1 13.6    192 264 236   , RFP + Serum Mag Trend:   Recent Labs     05/19/25  0828 10/07/24  0922 05/22/24  0811   GLUCOSE 108* 108* 104*    140 142   K 3.7 4.0 4.0   CL 98 101 100   CO2 29 28 29   ANIONGAP 12 15 17   BUN 18 22 23   CREATININE 0.89 0.98 0.95   EGFR 67 60* 63   CALCIUM 9.5 9.6 9.9   ALBUMIN 4.3 4.3 4.4   , LFT Trend:   Recent Labs     05/19/25  0828 10/07/24  0922 05/22/24  0811   ALBUMIN 4.3 4.3 4.4   BILITOT 0.4 0.4 0.5   ALKPHOS 29* 36 33   ALT 26 26 24   AST 22 23 26   PROT 7.0 7.2 7.6   , DM Specific Labs Trend (Includes HgbA1C, antibodies & fasting insulin):   Recent Labs     05/19/25  0828 10/07/24  0922  "05/22/24  0811   HGBA1C 6.1* 5.8* 5.9*   , Lipid Panel Trend:    Recent Labs     05/19/25  0828 10/07/24  0922 05/22/24  0811 09/19/23  0937 05/23/23  0851 09/22/22  0803   CHOL 237* 226* 248* 220* 225* 253*   HDL 71 64.1 67.3 67.3 68.5 61.0   LDLCALC 139* 131* 148*  --   --   --    LDLF  --   --   --  125* 127* 156*   VLDL  --  31 33 28 30 36   TRIG 143 156* 163* 138 149 181*   , Iron Panel + Serum Ferritin Trend:   Recent Labs     05/22/24  0811 05/23/23  0851 09/22/22  0803 05/26/22  0752   IRON 53 65 54 48   UIBC 284  --   --   --    TIBC 337  --  328 308   IRONSAT 16*  --  16* 16*   FERRITIN 176*  --  167* 174*   , Vitamin B12:   Lab Results   Component Value Date    BMPQMHRZ57 523 05/22/2024    , Folate:   Lab Results   Component Value Date    FOLATE 15.6 05/22/2024    , Vitamin D:   Lab Results   Component Value Date    VITD25 60 05/19/2025    , and CRP Trend (last 3): No results found for: \"HSCRP\"       Estimated Needs:  Total Energy Estimated Needs in 24 hours (kCal): 1200 kCal;    Total Protein Estimated Needs in 24 Hours (g): 78 g;     ;     ;                    Nutrition Diagnosis        Nutrition Diagnosis  Patient has Nutrition Diagnosis: Yes  Diagnosis Status (1): Active  Nutrition Diagnosis 1: Food and nutrition related knowledge deficit  Related to (1): how to eat for weight loss and pre-diabetes  As Evidenced by (1): reports by pt of the need to learn.       Nutrition Interventions/Recommendations   Nutrition Prescription: Oral nutrition Modified, consistent CHO intake  1,200 calories per day for 0.5 lb wt loss per week. CHO consistent meal plan with aim for 30- 45 grams of CHO at meals and 15 grams of CHO at snacks to reduce A1c. Adequate protein intake of 78 grams per day.    Nutrition Interventions:   Food and Nutrient Delivery: Meals & Snacks: Modification of schedule of oral intake, Carbohydrate-modified diet, Protein-modified diet  Goals: 1. Eating three meals per day can increase metabolism, " "this is called the thermal effect of eating. Eating three meals burns more calories than two meals consumed per day. Strive to eat breakfast within 1 -2 hours of waking to jump start the metabolism. Aim for breakfast at 8:30-9:30, lunch at noon-1:00, snack at 5:00 if needed, dinner at 6:00-7:00 and a bedtime snack at 10:00-11:00 pm.    2. Strive to include protein in the meals and even snacks. Protein in meals can increase metabolism too.  Protein in snacks can sustain energy, stabilize blood glucose, and provide more contentment. Protein foods include eggs, egg whites, cheese, nuts, nut butters, Greek yogurt, poultry, meat, fish, tofu, plant-based protein powder, and/or plant-based protein drinks.     3. The plate method was recommended to help portion foods at meals and to structure. Using a 9\" plate, recommended for 1/2 of the plate to include non-starchy vegetable and suggested to consume this first.  Recommended protein to be included but limited to 3 ounces at meals. Recommended 1/4 of the plate or 1 cup of grain or starch with fruit, milk or yogurt at meals. For more information on the plate method visit myplate.gov.     4. Even snacks strive to consume carbohydrates and protein. Carbohydrates provide energy and protein helps to sustain the energy. Having carbohydrates by itself can increase blood glucose; thus, strive to pair.     Coordination of Care:       Nutrition Education:   Nutrition Education Content: Content related nutrition education  discused how a better eating pattern and schedule can improve blood glucose metabolism        Educational Handouts Provided: Plate Method for Pre-diabetes (2022)     Nutrition Counseling:   Nutrition Counseling Strategies : Nutrition counseling based on motivational interviewing strategy, Nutrition counseling based on goal setting strategy    Patient Goals:   Eating three meals per day can increase metabolism, this is called the thermal effect of eating. Eating three " "meals burns more calories than two meals consumed per day. Strive to eat breakfast within 1 -2 hours of waking to jump start the metabolism. Aim for breakfast at 8:30-9:30, lunch at noon-1:00, snack at 5:00 if needed, dinner at 6:00-7:00 and a bedtime snack at 10:00-11:00 pm.    Strive to include protein in the meals and even snacks. Protein in meals can increase metabolism too.  Protein in snacks can sustain energy, stabilize blood glucose, and provide more contentment. Protein foods include eggs, egg whites, cheese, nuts, nut butters, Greek yogurt, poultry, meat, fish, tofu, plant-based protein powder, and/or plant-based protein drinks.     The plate method was recommended to help portion foods at meals and to structure. Using a 9\" plate, recommended for 1/2 of the plate to include non-starchy vegetable and suggested to consume this first.  Recommended protein to be included but limited to 3 ounces at meals. Recommended 1/4 of the plate or 1 cup of grain or starch with fruit, milk or yogurt at meals. For more information on the plate method visit myplate.gov.     Even snacks strive to consume carbohydrates and protein. Carbohydrates provide energy and protein helps to sustain the energy. Having carbohydrates by itself can increase blood glucose; thus, strive to pair.       Readiness to Change : Good  Level of Understanding : Good  Anticipated Compliant : Good         Nutrition Monitoring and Evaluation   Food and Nutrient Intake  Monitoring and Evaluation Plan: Meal/snack pattern, Carbohydrate intake, Protein intake    Knowledge Belief Attitude Determination   Monitoring and Evaluation Plan: Food and nutrition knowledge  Criteria: ability to apply the plate method    Anthropometric measurements  Monitoring and Evaluation Plan: Weight    Biochemical Data, Medical Tests and Procedures  Monitoring and Evaluation Plan: Glucose/endocrine profile  Glucose/Endocrine Profile: Hemoglobin A1c (HgbA1c)         Goal Status:  "          Follow Up: 1 month     Aparna Novoa, MS, RDN, LD, BAKARI, MB-[email or web address]   Advanced Practice Clinical Dietitian   Mindfulness-Based Eating Awareness Training Practitioner   [place]   [facility]   [email or web address]   Scheduling Line [phone]   Direct Line [phone]              [1]   Patient Active Problem List  Diagnosis    Benign essential HTN    Allergic rhinitis    Breast cancer, left    Dense breast tissue on mammogram    Elevated fasting blood sugar    Hyperlipidemia    Infiltrating ductal carcinoma of left breast    Iron deficiency anemia    Leg mass, left    Palpitations    Pronation deformity of both feet    Pulmonary nodule    PVC (premature ventricular contraction)    Situational anxiety    Vitamin D deficiency    Vitamin B deficiency    Varicose vein of leg    Postmenopausal    Obesity with body mass index 30 or greater    Chronic bilateral low back pain    Acute right-sided low back pain with right-sided sciatica    Other specified peripheral vascular diseases    Screening for cardiovascular condition    Pre-diabetes    Hearing loss

## 2025-07-23 ENCOUNTER — APPOINTMENT (OUTPATIENT)
Dept: INTEGRATIVE MEDICINE | Facility: CLINIC | Age: 75
End: 2025-07-23
Payer: MEDICARE

## 2025-07-23 DIAGNOSIS — M54.41 CHRONIC BILATERAL LOW BACK PAIN WITH RIGHT-SIDED SCIATICA: Primary | ICD-10-CM

## 2025-07-23 DIAGNOSIS — G89.29 CHRONIC BILATERAL LOW BACK PAIN WITH RIGHT-SIDED SCIATICA: Primary | ICD-10-CM

## 2025-07-23 PROCEDURE — 99203 OFFICE O/P NEW LOW 30 MIN: CPT

## 2025-07-23 NOTE — PATIENT INSTRUCTIONS
Plan:  Chronic back pain:  -Recommend acupuncture; 6 weekly visits initial, can continue if improvement in symptoms   -Recommend ongoing management with pain management and PCP      She denies any red flag symptoms including weight loss, fevers, chills, night time awakening of pain, bowel bladder incontinence, saddle anesthesia, progressive numbness or weakness. We discussed that if these symptoms should arise she should seek emergency treatment.       Explained briefly what acupuncture is and how it can be helpful for back pain. Recommend six once weekly visits of acupuncture as trial to see if this improves their back pain. Recommend continue more treatments if it is helpful. Discussed risks of acupuncture which include bleeding, bruising, dizziness. Recommend to eat and drink prior to acupuncture appointments. Patient questions answered. Risks and benefits of care were reviewed.      Follow up: as needed; call the office to schedule: [phone]

## 2025-07-24 ENCOUNTER — APPOINTMENT (OUTPATIENT)
Dept: INTEGRATIVE MEDICINE | Facility: CLINIC | Age: 75
End: 2025-07-24
Payer: MEDICARE

## 2025-07-24 DIAGNOSIS — M54.59 OTHER LOW BACK PAIN: Primary | ICD-10-CM

## 2025-07-24 PROCEDURE — 97810 ACUP 1/> WO ESTIM 1ST 15 MIN: CPT

## 2025-07-24 PROCEDURE — 97811 ACUP 1/> W/O ESTIM EA ADD 15: CPT

## 2025-07-24 NOTE — PROGRESS NOTES
Acupuncture Visit:     Subjective   Patient ID: Obdulia Washington is a 75 y.o. female who presents for Back Pain  Continue working on mid-back and low back pain  Px improving, was able to garden this morning and walk 1.5mi    Working with a dietician to regular blood sugar        Session Information  Is this acupuncture treatment being billed to the patient's insurance company: Yes  This is visit number: 1  The patient has a total number of visits of: 12  Initial Acupuncture Treatment date: 07/24/25  Name of Insurance Company: Medicare A [employer] B  Visit Type: Follow-up visit  Medical History Reviewed: I have reviewed pertinent medical history in EHR, and no contraindications are present to provide treatment         Review of Systems         Provider reviewed plan for the acupuncture session, precautions and contraindications. Patient/guardian/hospital staff has given consent to treat with full understanding of what to expect during the session. Before acupuncture began, provider explained to the patient to communicate at any time if the procedure was causing discomfort past their tolerance level. Patient agreed to advise acupuncturist. The acupuncturist counseled the patient on the risks of acupuncture treatment including pain, infection, bleeding, and no relief of pain. The patient was positioned comfortably. There was no evidence of infection at the site of needle insertions.    Objective   Physical Exam              Acupuncture Treatment  Patient Position: Prone on a table  Acupuncture Needling: Yes  Needle Guage: 36 guage /.20/ Blue seirin, 40 guage /.16/ Red seirin  Body Points: With retention  Body Points - Left: Gb34, Gb37  Body Points - Bilateral: Du9, HTJJ T8, T9, T10, T11, Bl13, Gb25  Body Points - Right: Sp6  Auricular Points: No  Acupuncture Treatment Change: No changes  Electroacupuncture Used: No  Needle Count In: 16  Needle Count Out: 16  Needle Retention Time (min): 25 minutes  Total Face to Face Time  (min): 20 minutes              Assessment/Plan

## 2025-07-29 ENCOUNTER — AMBULATORY SURGICAL CENTER (OUTPATIENT)
Dept: URBAN - METROPOLITAN AREA SURGERY 12 | Facility: SURGERY | Age: 75
End: 2025-07-29
Payer: MEDICARE

## 2025-07-29 ENCOUNTER — OFFICE (OUTPATIENT)
Dept: URBAN - METROPOLITAN AREA PATHOLOGY 2 | Facility: PATHOLOGY | Age: 75
End: 2025-07-29
Payer: MEDICARE

## 2025-07-29 VITALS
SYSTOLIC BLOOD PRESSURE: 141 MMHG | OXYGEN SATURATION: 89 % | SYSTOLIC BLOOD PRESSURE: 122 MMHG | RESPIRATION RATE: 16 BRPM | OXYGEN SATURATION: 99 % | DIASTOLIC BLOOD PRESSURE: 64 MMHG | SYSTOLIC BLOOD PRESSURE: 127 MMHG | DIASTOLIC BLOOD PRESSURE: 53 MMHG | SYSTOLIC BLOOD PRESSURE: 127 MMHG | SYSTOLIC BLOOD PRESSURE: 146 MMHG | DIASTOLIC BLOOD PRESSURE: 59 MMHG | RESPIRATION RATE: 5 BRPM | DIASTOLIC BLOOD PRESSURE: 45 MMHG | SYSTOLIC BLOOD PRESSURE: 166 MMHG | HEART RATE: 66 BPM | RESPIRATION RATE: 16 BRPM | DIASTOLIC BLOOD PRESSURE: 57 MMHG | HEIGHT: 63 IN | DIASTOLIC BLOOD PRESSURE: 64 MMHG | DIASTOLIC BLOOD PRESSURE: 55 MMHG | WEIGHT: 171 LBS | SYSTOLIC BLOOD PRESSURE: 141 MMHG | OXYGEN SATURATION: 98 % | WEIGHT: 171 LBS | SYSTOLIC BLOOD PRESSURE: 162 MMHG | OXYGEN SATURATION: 96 % | DIASTOLIC BLOOD PRESSURE: 57 MMHG | DIASTOLIC BLOOD PRESSURE: 55 MMHG | SYSTOLIC BLOOD PRESSURE: 136 MMHG | OXYGEN SATURATION: 100 % | OXYGEN SATURATION: 92 % | OXYGEN SATURATION: 94 % | RESPIRATION RATE: 21 BRPM | OXYGEN SATURATION: 93 % | RESPIRATION RATE: 19 BRPM | SYSTOLIC BLOOD PRESSURE: 146 MMHG | TEMPERATURE: 97.9 F | DIASTOLIC BLOOD PRESSURE: 60 MMHG | HEART RATE: 65 BPM | SYSTOLIC BLOOD PRESSURE: 162 MMHG | OXYGEN SATURATION: 98 % | OXYGEN SATURATION: 98 % | OXYGEN SATURATION: 92 % | OXYGEN SATURATION: 100 % | HEART RATE: 61 BPM | SYSTOLIC BLOOD PRESSURE: 122 MMHG | DIASTOLIC BLOOD PRESSURE: 60 MMHG | WEIGHT: 171 LBS | RESPIRATION RATE: 13 BRPM | HEIGHT: 63 IN | OXYGEN SATURATION: 93 % | DIASTOLIC BLOOD PRESSURE: 64 MMHG | HEART RATE: 65 BPM | HEART RATE: 67 BPM | OXYGEN SATURATION: 89 % | OXYGEN SATURATION: 99 % | HEART RATE: 74 BPM | DIASTOLIC BLOOD PRESSURE: 53 MMHG | OXYGEN SATURATION: 93 % | RESPIRATION RATE: 16 BRPM | SYSTOLIC BLOOD PRESSURE: 136 MMHG | OXYGEN SATURATION: 92 % | HEART RATE: 66 BPM | OXYGEN SATURATION: 94 % | SYSTOLIC BLOOD PRESSURE: 127 MMHG | SYSTOLIC BLOOD PRESSURE: 132 MMHG | DIASTOLIC BLOOD PRESSURE: 55 MMHG | HEART RATE: 67 BPM | RESPIRATION RATE: 13 BRPM | SYSTOLIC BLOOD PRESSURE: 132 MMHG | DIASTOLIC BLOOD PRESSURE: 45 MMHG | HEART RATE: 61 BPM | RESPIRATION RATE: 5 BRPM | DIASTOLIC BLOOD PRESSURE: 53 MMHG | HEART RATE: 65 BPM | OXYGEN SATURATION: 96 % | RESPIRATION RATE: 6 BRPM | RESPIRATION RATE: 6 BRPM | SYSTOLIC BLOOD PRESSURE: 136 MMHG | SYSTOLIC BLOOD PRESSURE: 122 MMHG | RESPIRATION RATE: 19 BRPM | RESPIRATION RATE: 13 BRPM | RESPIRATION RATE: 19 BRPM | OXYGEN SATURATION: 99 % | DIASTOLIC BLOOD PRESSURE: 60 MMHG | DIASTOLIC BLOOD PRESSURE: 59 MMHG | TEMPERATURE: 97.9 F | HEIGHT: 63 IN | SYSTOLIC BLOOD PRESSURE: 166 MMHG | HEART RATE: 61 BPM | SYSTOLIC BLOOD PRESSURE: 162 MMHG | HEART RATE: 74 BPM | RESPIRATION RATE: 21 BRPM | SYSTOLIC BLOOD PRESSURE: 146 MMHG | HEART RATE: 74 BPM | DIASTOLIC BLOOD PRESSURE: 59 MMHG | DIASTOLIC BLOOD PRESSURE: 45 MMHG | SYSTOLIC BLOOD PRESSURE: 132 MMHG | OXYGEN SATURATION: 94 % | RESPIRATION RATE: 21 BRPM | SYSTOLIC BLOOD PRESSURE: 166 MMHG | RESPIRATION RATE: 6 BRPM | TEMPERATURE: 97.9 F | OXYGEN SATURATION: 89 % | OXYGEN SATURATION: 96 % | RESPIRATION RATE: 5 BRPM | SYSTOLIC BLOOD PRESSURE: 141 MMHG | DIASTOLIC BLOOD PRESSURE: 57 MMHG | HEART RATE: 66 BPM | OXYGEN SATURATION: 100 % | HEART RATE: 67 BPM

## 2025-07-29 DIAGNOSIS — K31.89 OTHER DISEASES OF STOMACH AND DUODENUM: ICD-10-CM

## 2025-07-29 DIAGNOSIS — K22.70 BARRETT'S ESOPHAGUS WITHOUT DYSPLASIA: ICD-10-CM

## 2025-07-29 DIAGNOSIS — K44.9 DIAPHRAGMATIC HERNIA WITHOUT OBSTRUCTION OR GANGRENE: ICD-10-CM

## 2025-07-29 DIAGNOSIS — K22.89 OTHER SPECIFIED DISEASE OF ESOPHAGUS: ICD-10-CM

## 2025-07-29 DIAGNOSIS — K29.60 OTHER GASTRITIS WITHOUT BLEEDING: ICD-10-CM

## 2025-07-29 PROCEDURE — 88313 SPECIAL STAINS GROUP 2: CPT | Performed by: PATHOLOGY

## 2025-07-29 PROCEDURE — 88342 IMHCHEM/IMCYTCHM 1ST ANTB: CPT | Performed by: PATHOLOGY

## 2025-07-29 PROCEDURE — 43239 EGD BIOPSY SINGLE/MULTIPLE: CPT | Performed by: INTERNAL MEDICINE

## 2025-07-29 PROCEDURE — 88305 TISSUE EXAM BY PATHOLOGIST: CPT | Performed by: PATHOLOGY

## 2025-07-31 ENCOUNTER — APPOINTMENT (OUTPATIENT)
Dept: INTEGRATIVE MEDICINE | Facility: CLINIC | Age: 75
End: 2025-07-31
Payer: MEDICARE

## 2025-07-31 DIAGNOSIS — M54.59 OTHER LOW BACK PAIN: Primary | ICD-10-CM

## 2025-07-31 PROCEDURE — 97810 ACUP 1/> WO ESTIM 1ST 15 MIN: CPT

## 2025-07-31 PROCEDURE — 97811 ACUP 1/> W/O ESTIM EA ADD 15: CPT

## 2025-07-31 NOTE — PROGRESS NOTES
Acupuncture Visit:     Subjective   Patient ID: Obdulia Washington is a 75 y.o. female who presents for Back Pain  Low back pain improving. Will have 1-2 days with intense pain  More good days than bad    *Updated 7/31/2025  Medicare  This is visit number: 2/12  90 Days: October 22, 2025  Initial Acupuncture Treatment date: 7/24/2025        Session Information  Is this acupuncture treatment being billed to the patient's insurance company: Yes  This is visit number: 2  The patient has a total number of visits of: 12  Initial Acupuncture Treatment date: 07/24/25  Last Treatment date: 07/24/25  Name of Insurance Company: Medicare  Name of Supervising Physician: Amarilis  Visit Type: Follow-up visit  Medical History Reviewed: I have reviewed pertinent medical history in EHR, and no contraindications are present to provide treatment         Review of Systems         Provider reviewed plan for the acupuncture session, precautions and contraindications. Patient/guardian/hospital staff has given consent to treat with full understanding of what to expect during the session. Before acupuncture began, provider explained to the patient to communicate at any time if the procedure was causing discomfort past their tolerance level. Patient agreed to advise acupuncturist. The acupuncturist counseled the patient on the risks of acupuncture treatment including pain, infection, bleeding, and no relief of pain. The patient was positioned comfortably. There was no evidence of infection at the site of needle insertions.    Objective   Physical Exam              Acupuncture Treatment  Patient Position: Prone on a table  Acupuncture Needling: Yes  Needle Guage: 36 guage /.20/ Blue seirin, 40 guage /.16/ Red seirin  Body Points - Left: Gb34  Body Points - Bilateral: Du14, HTJJ at T3, T5, T7, Du9  Body Points - Right: Sp6  Auricular Points: No  Acupuncture Treatment Change: No changes  Electroacupuncture Used: No  Other Techniques Utilized: TDP  Susan Nichols Description: down spine and paraspinals  TDP Lamp Descripton: over Du9  Needle Retention Time (min): 25 minutes  Total Face to Face Time (min): 20 minutes              Assessment/Plan

## 2025-08-07 ENCOUNTER — APPOINTMENT (OUTPATIENT)
Dept: INTEGRATIVE MEDICINE | Facility: CLINIC | Age: 75
End: 2025-08-07
Payer: MEDICARE

## 2025-08-07 DIAGNOSIS — M54.59 OTHER LOW BACK PAIN: Primary | ICD-10-CM

## 2025-08-07 PROCEDURE — 97811 ACUP 1/> W/O ESTIM EA ADD 15: CPT

## 2025-08-07 PROCEDURE — 97810 ACUP 1/> WO ESTIM 1ST 15 MIN: CPT

## 2025-08-07 NOTE — PROGRESS NOTES
Acupuncture Visit:     Subjective   Patient ID: Obdulia Washington is a 75 y.o. female who presents for Back Pain  Px continuing to note improvements in lbp  Able to garden without issues earlier this week    *Updated 8/7/2025  Medicare AB  This is visit number: 3/12  90 Days: October 22, 2025  Initial Acupuncture Treatment date: 7/24/2025          Session Information  Is this acupuncture treatment being billed to the patient's insurance company: Yes  This is visit number: 3  The patient has a total number of visits of: 1  Initial Acupuncture Treatment date: 07/24/25  Last Treatment date: 07/31/25  Name of Insurance Company: Medicare AB  Name of Supervising Physician: Amarilis  Visit Type: Follow-up visit  Medical History Reviewed: I have reviewed pertinent medical history in EHR, and no contraindications are present to provide treatment         Review of Systems         Provider reviewed plan for the acupuncture session, precautions and contraindications. Patient/guardian/hospital staff has given consent to treat with full understanding of what to expect during the session. Before acupuncture began, provider explained to the patient to communicate at any time if the procedure was causing discomfort past their tolerance level. Patient agreed to advise acupuncturist. The acupuncturist counseled the patient on the risks of acupuncture treatment including pain, infection, bleeding, and no relief of pain. The patient was positioned comfortably. There was no evidence of infection at the site of needle insertions.    Objective   Physical Exam              Acupuncture Treatment  Patient Position: Prone on a table  Acupuncture Needling: Yes  Needle Guage: 40 guage /.16/ Red seirin, 36 guage /.20/ Blue seirin  Body Points: With retention  Body Points - Left: Gb34, Bl64  Body Points - Bilateral: Du14, Du9, HTJJ C7, T1, T2, T4, T8  Body Points - Right: Sp6  Auricular Points: No  Acupuncture Treatment Change: No  changes  Electroacupuncture Used: No  Other Techniques Utilized: TDP Lamp  TDP Lamp Descripton: over mid-low back  Needle Count In: 15  Needle Count Out: 15  Needle Retention Time (min): 25 minutes  Total Face to Face Time (min): 25 minutes              Assessment/Plan

## 2025-08-14 ENCOUNTER — APPOINTMENT (OUTPATIENT)
Dept: INTEGRATIVE MEDICINE | Facility: CLINIC | Age: 75
End: 2025-08-14
Payer: MEDICARE

## 2025-08-14 DIAGNOSIS — M54.59 OTHER LOW BACK PAIN: Primary | ICD-10-CM

## 2025-08-14 PROCEDURE — 97810 ACUP 1/> WO ESTIM 1ST 15 MIN: CPT

## 2025-08-14 PROCEDURE — 97811 ACUP 1/> W/O ESTIM EA ADD 15: CPT

## 2025-08-17 DIAGNOSIS — I10 HYPERTENSION, UNSPECIFIED TYPE: ICD-10-CM

## 2025-08-18 RX ORDER — LOSARTAN POTASSIUM 50 MG/1
50 TABLET ORAL
Qty: 180 TABLET | Refills: 0 | Status: SHIPPED | OUTPATIENT
Start: 2025-08-18

## 2025-08-21 ENCOUNTER — APPOINTMENT (OUTPATIENT)
Dept: INTEGRATIVE MEDICINE | Facility: CLINIC | Age: 75
End: 2025-08-21
Payer: MEDICARE

## 2025-08-21 DIAGNOSIS — M54.59 OTHER LOW BACK PAIN: Primary | ICD-10-CM

## 2025-08-21 PROCEDURE — 97811 ACUP 1/> W/O ESTIM EA ADD 15: CPT

## 2025-08-21 PROCEDURE — 97810 ACUP 1/> WO ESTIM 1ST 15 MIN: CPT

## 2025-08-28 ENCOUNTER — APPOINTMENT (OUTPATIENT)
Dept: INTEGRATIVE MEDICINE | Facility: CLINIC | Age: 75
End: 2025-08-28
Payer: MEDICARE

## 2025-08-29 DIAGNOSIS — K21.9 GASTROESOPHAGEAL REFLUX DISEASE WITHOUT ESOPHAGITIS: ICD-10-CM

## 2025-08-29 RX ORDER — OMEPRAZOLE 20 MG/1
20 CAPSULE, DELAYED RELEASE ORAL DAILY
Qty: 90 CAPSULE | Refills: 3 | Status: SHIPPED | OUTPATIENT
Start: 2025-08-29 | End: 2026-08-29

## 2025-09-04 ENCOUNTER — APPOINTMENT (OUTPATIENT)
Dept: INTEGRATIVE MEDICINE | Facility: CLINIC | Age: 75
End: 2025-09-04
Payer: MEDICARE

## 2025-09-23 ENCOUNTER — APPOINTMENT (OUTPATIENT)
Dept: NUTRITION | Facility: CLINIC | Age: 75
End: 2025-09-23
Payer: MEDICARE

## 2025-09-24 ENCOUNTER — APPOINTMENT (OUTPATIENT)
Dept: INTEGRATIVE MEDICINE | Facility: CLINIC | Age: 75
End: 2025-09-24
Payer: MEDICARE

## 2025-10-01 ENCOUNTER — APPOINTMENT (OUTPATIENT)
Dept: INTEGRATIVE MEDICINE | Facility: CLINIC | Age: 75
End: 2025-10-01
Payer: MEDICARE

## 2025-10-08 ENCOUNTER — APPOINTMENT (OUTPATIENT)
Dept: INTEGRATIVE MEDICINE | Facility: CLINIC | Age: 75
End: 2025-10-08
Payer: MEDICARE

## 2025-10-17 ENCOUNTER — APPOINTMENT (OUTPATIENT)
Dept: PRIMARY CARE | Facility: CLINIC | Age: 75
End: 2025-10-17
Payer: MEDICARE